# Patient Record
Sex: FEMALE | Race: WHITE | NOT HISPANIC OR LATINO | Employment: OTHER | URBAN - METROPOLITAN AREA
[De-identification: names, ages, dates, MRNs, and addresses within clinical notes are randomized per-mention and may not be internally consistent; named-entity substitution may affect disease eponyms.]

---

## 2017-07-15 ENCOUNTER — HOSPITAL ENCOUNTER (EMERGENCY)
Facility: MEDICAL CENTER | Age: 37
End: 2017-07-15
Attending: EMERGENCY MEDICINE
Payer: MEDICAID

## 2017-07-15 VITALS
RESPIRATION RATE: 20 BRPM | HEART RATE: 103 BPM | TEMPERATURE: 97.8 F | WEIGHT: 293 LBS | DIASTOLIC BLOOD PRESSURE: 64 MMHG | BODY MASS INDEX: 44.41 KG/M2 | HEIGHT: 68 IN | OXYGEN SATURATION: 95 % | SYSTOLIC BLOOD PRESSURE: 106 MMHG

## 2017-07-15 DIAGNOSIS — M25.541 ARTHRALGIA OF RIGHT HAND: Primary | ICD-10-CM

## 2017-07-15 PROCEDURE — 700111 HCHG RX REV CODE 636 W/ 250 OVERRIDE (IP): Performed by: EMERGENCY MEDICINE

## 2017-07-15 PROCEDURE — 96372 THER/PROPH/DIAG INJ SC/IM: CPT

## 2017-07-15 PROCEDURE — 99284 EMERGENCY DEPT VISIT MOD MDM: CPT

## 2017-07-15 RX ORDER — IBUPROFEN 600 MG/1
600 TABLET ORAL EVERY 6 HOURS PRN
Qty: 30 TAB | Refills: 0 | Status: SHIPPED | OUTPATIENT
Start: 2017-07-15 | End: 2019-12-12

## 2017-07-15 RX ORDER — DEXAMETHASONE SODIUM PHOSPHATE 10 MG/ML
10 INJECTION, SOLUTION INTRAMUSCULAR; INTRAVENOUS ONCE
Status: COMPLETED | OUTPATIENT
Start: 2017-07-15 | End: 2017-07-15

## 2017-07-15 RX ORDER — KETOROLAC TROMETHAMINE 30 MG/ML
30 INJECTION, SOLUTION INTRAMUSCULAR; INTRAVENOUS ONCE
Status: COMPLETED | OUTPATIENT
Start: 2017-07-15 | End: 2017-07-15

## 2017-07-15 RX ADMIN — KETOROLAC TROMETHAMINE 30 MG: 30 INJECTION, SOLUTION INTRAMUSCULAR at 01:15

## 2017-07-15 RX ADMIN — DEXAMETHASONE SODIUM PHOSPHATE 10 MG: 10 INJECTION, SOLUTION INTRAMUSCULAR; INTRAVENOUS at 01:13

## 2017-07-15 ASSESSMENT — LIFESTYLE VARIABLES: DO YOU DRINK ALCOHOL: NO

## 2017-07-15 ASSESSMENT — PAIN SCALES - GENERAL: PAINLEVEL_OUTOF10: 10

## 2017-07-15 NOTE — ED AVS SNAPSHOT
7/15/2017    Mila [place] [address]  [place] [address]    Dear Mila:    [facility] wants to ensure your discharge home is safe and you or your loved ones have had all of your questions answered regarding your care after you leave the hospital.    Below is a list of resources and contact information should you have any questions regarding your hospital stay, follow-up instructions, or active medical symptoms.    Questions or Concerns Regarding… Contact   Medical Questions Related to Your Discharge  (7 days a week, 8am-5pm) Contact a Nurse Care Coordinator   [phone]   Medical Questions Not Related to Your Discharge  (24 hours a day / 7 days a week)  Contact the Nurse Health Line   [phone]    Medications or Discharge Instructions Refer to your discharge packet   or contact your [facility] Primary Care Provider   [phone]   Follow-up Appointment(s) Schedule your appointment via Moblication   or contact Scheduling [phone]   Billing Review your statement via Moblication  or contact Billing [phone]   Medical Records Review your records via Moblication   or contact Medical Records [phone]     You may receive a telephone call within two days of discharge. This call is to make certain you understand your discharge instructions and have the opportunity to have any questions answered. You can also easily access your medical information, test results and upcoming appointments via the Moblication free online health management tool. You can learn more and sign up at [employer]Moblication. For assistance setting up your Moblication account, please call [phone].    Once again, we want to ensure your discharge home is safe and that you have a clear understanding of any next steps in your care. If you have any questions or concerns, please do not hesitate to contact us, we are here for you. Thank you for choosing [facility] for your healthcare needs.    Sincerely,    Your [facility] Healthcare Team

## 2017-07-15 NOTE — ED AVS SNAPSHOT
Home Care Instructions                                                                                                                Mila Galeana   MRN: [id number]    Department:  [facility], Emergency Dept   Date of Visit:  7/15/2017            [facility], Emergency Dept    [address] [facility] [address]    Phone:  [phone]      You were seen by     Evelyn Santos D.O.      Your Diagnosis Was     Arthralgia of right hand     M25.541       These are the medications you received during your hospitalization from 07/15/2017 0015 to 07/15/2017 0141     Date/Time Order Dose Route Action    07/15/2017 0115 ketorolac (TORADOL) injection 30 mg 30 mg Intramuscular Given    07/15/2017 0113 dexamethasone pf (DECADRON) injection 10 mg 10 mg Oral Given      Follow-up Information     1. Follow up with Dat Wilson M.D. In 1 week.    Specialty:  Orthopaedics    Why:  Orthopedics    Contact information    [address]  [address]  [facility] [address] [phone]          2. Follow up with [facility] In 3 days.    Contact information    [facility] [address] [phone]          3. Follow up with [facility] In 3 days.    Contact information    [address] [facility]  [phone]          4. Follow up with [facility] In 3 days.    Contact information    [address] [address] [phone]        5. Follow up with Moira Ling M.D. In 3 days.    Specialty:  Family Medicine    Contact information    [address] [place] [address] [address]  [address]  [facility] [address] [phone]        Medication Information     Review all of your home medications and newly ordered medications with your primary doctor and/or pharmacist as soon as possible. Follow medication instructions as directed by your doctor and/or pharmacist.     Please keep your complete medication list with you and share with your physician. Update the information when medications are discontinued,  doses are changed, or new medications (including over-the-counter products) are added; and carry medication information at all times in the event of emergency situations.               Medication List      START taking these medications        Instructions    Morning Afternoon Evening Bedtime    ibuprofen 600 MG Tabs   Commonly known as:  MOTRIN        Take 1 Tab by mouth every 6 hours as needed.   Dose:  600 mg                             Where to Get Your Medications      You can get these medications from any pharmacy     Bring a paper prescription for each of these medications    - ibuprofen 600 MG Tabs            Procedures and tests performed during your visit     SPLINT APPLICATION        Discharge Instructions       Follow-up with primary care or orthopedics next week for reevaluation, medication management and close blood pressure monitoring. Further outpatient testing or MRI may be indicated if symptoms persist.    Motrin 4 times daily as needed for hand pain or swelling.    Wrist splint for activity, typing and at night.    Return to the emergency department for persistent or worsening hand pain, swelling, paresthesias, weakness, fever or other new concerns.    Joint Pain  Joint pain can be caused by many things. The joint can be bruised, infected, weak from aging, or sore from exercise. The pain will probably go away if you follow your doctor's instructions for home care. If your joint pain continues, more tests may be needed to help find the cause of your condition.  HOME CARE  Watch your condition for any changes. Follow these instructions as told to lessen the pain that you are feeling:  · Take medicines only as told by your doctor.  · Rest the sore joint for as long as told by your doctor. If your doctor tells you to, raise (elevate) the painful joint above the level of your heart while you are sitting or lying down.  · Do not do things that cause pain or make the pain worse.  · If told, put ice on  the painful area:  ¨ Put ice in a plastic bag.  ¨ Place a towel between your skin and the bag.  ¨ Leave the ice on for 20 minutes, 2-3 times per day.  · Wear an elastic bandage, splint, or sling as told by your doctor. Loosen the bandage or splint if your fingers or toes lose feeling (become numb) and tingle, or if they turn cold and blue.  · Begin exercising or stretching the joint as told by your doctor. Ask your doctor what types of exercise are safe for you.  · Keep all follow-up visits as told by your doctor. This is important.  GET HELP IF:  · Your pain gets worse and medicine does not help it.  · Your joint pain does not get better in 3 days.  · You have more bruising or swelling.  · You have a fever.  · You lose 10 pounds (4.5 kg) or more without trying.  GET HELP RIGHT AWAY IF:  · You are not able to move the joint.  · Your fingers or toes become numb or they turn cold and blue.     This information is not intended to replace advice given to you by your health care provider. Make sure you discuss any questions you have with your health care provider.     Document Released: 12/06/2010 Document Revised: 01/08/2016 Document Reviewed: 09/29/2015  [employer] Interactive Patient Education ©2016 [employer]  Carpal Tunnel Syndrome  The carpal tunnel is an area under the skin of the palm of your hand. Nerves, blood vessels, and strong tissues (tendons) pass through the tunnel. The tunnel can become puffy (swollen). If this happens, a nerve can be pinched in the wrist. This causes carpal tunnel syndrome.   HOME CARE  · Take all medicine as told by your doctor.  · If you were given a splint, wear it as told. Wear it at night or at times when your doctor told you to.  · Rest your wrist from the activity that causes your pain.  · Put ice on your wrist after long periods of wrist activity.  ¨ Put ice in a plastic bag.  ¨ Place a towel between your skin and the bag.  ¨ Leave the ice on for 15-20 minutes, 03-04 times a  day.  · Keep all doctor visits as told.  GET HELP RIGHT AWAY IF:  · You have new problems you cannot explain.  · Your problems get worse and medicine does not help.  MAKE SURE YOU:   · Understand these instructions.  · Will watch your condition.  · Will get help right away if you are not doing well or get worse.     This information is not intended to replace advice given to you by your health care provider. Make sure you discuss any questions you have with your health care provider.     Document Released: 12/06/2012 Document Revised: 03/11/2013 Document Reviewed: 12/06/2012  [employer] Interactive Patient Education ©2016 [employer]            Patient Information     Patient Information    Following emergency treatment: all patient requiring follow-up care must return either to a private physician or a clinic if your condition worsens before you are able to obtain further medical attention, please return to the emergency room.     Billing Information    At [facility], we work to make the billing process streamlined for our patients.  Our Representatives are here to answer any questions you may have regarding your hospital bill.  If you have insurance coverage and have supplied your insurance information to us, we will submit a claim to your insurer on your behalf.  Should you have any questions regarding your bill, we can be reached online or by phone as follows:  Online: You are able pay your bills online or live chat with our representatives about any billing questions you may have. We are here to help Monday - Friday from 8:00am to 7:30pm and 9:00am - 12:00pm on Saturdays.  Please visit [email or web address]  for more information.   Phone:  [phone] or [phone]    Please note that your emergency physician, surgeon, pathologist, radiologist, anesthesiologist, and other specialists are not employed by [facility] and will therefore bill separately for their services.  Please  contact them directly for any questions concerning their bills at the numbers below:     Emergency Physician Services:  [phone]  [facility]:  [phone]  [facility]:  [phone]  [facility]:  [phone]    1. Your final bill may vary from the amount quoted upon discharge if all procedures are not complete at that time, or if your doctor has additional procedures of which we are not aware. You will receive an additional bill if you return to the Emergency Department at [facility] for suture removal regardless of the facility of which the sutures were placed.     2. Please arrange for settlement of this account at the emergency registration.    3. All self-pay accounts are due in full at the time of treatment.  If you are unable to meet this obligation then payment is expected within 4-5 days.     4. If you have had radiology studies (CT, X-ray, Ultrasound, MRI), you have received a preliminary result during your emergency department visit. Please contact the radiology department [phone] to receive a copy of your final result. Please discuss the Final result with your primary physician or with the follow up physician provided.     Crisis Hotline:  [place] Crisis Hotline:  5-464-CUQCWAA or 1-104.805.5599  [place] Crisis Hotline:    1-243.883.2478 or 725-322-3133         ED Discharge Follow Up Questions    1. In order to provide you with very good care, we would like to follow up with a phone call in the next few days.  May we have your permission to contact you?     YES /  NO    2. What is the best phone number to call you? (       )_____-__________    3. What is the best time to call you?      Morning  /  Afternoon  /  Evening                   Patient Signature:  ____________________________________________________________    Date:  ____________________________________________________________

## 2017-07-15 NOTE — ED PROVIDER NOTES
"ED Provider Note    CHIEF COMPLAINT  Chief Complaint   Patient presents with   • Hand Pain     right       HPI  Mila Galeana is a 37 y.o. female who presents to the emergency department complaining of intermittent right hand pain, numbness, tingling progressive over the last 1-2 weeks. Most discomfort and paresthesias over the lateral or subtle hand and 1st and 2nd digits. Patient has had some difficulty gripping objects. Intermittent use of aspirin or naproxen without relief at home. Patient denies any neck pain, stiffness or upper arm discomfort. Denies any trauma, strain, injury or new activities. However, patient does type on a computer regularly. No headache, visual changes, slurred speech or other focal weakness.    REVIEW OF SYSTEMS  See [facility] for further details. All other systems are negative.     PAST MEDICAL HISTORY    denies    SOCIAL HISTORY  Social History     Social History Main Topics   • Smoking status: Never Smoker    • Smokeless tobacco: Not on file   • Alcohol Use: No   • Drug Use: No   • Sexual Activity: Not on file       SURGICAL HISTORY  patient denies any surgical history    CURRENT MEDICATIONS  Home Medications     **Home medications have not yet been reviewed for this encounter**          ALLERGIES  Allergies   Allergen Reactions   • Sulfa Drugs        PHYSICAL EXAM  VITAL SIGNS: /108 mmHg  Pulse 126  Temp(Src) 36.6 °C (97.8 °F)  Resp 20  Ht 1.727 m (5' 8\")  Wt 258.5 kg (569 lb 14.2 oz)  BMI 86.67 kg/m2  SpO2 96%  Pulse ox interpretation: I interpret this pulse ox as normal.  Constitutional: Alert in no apparent distress. Morbidly obese.  HENT: Normocephalic, atraumatic. Bilateral external ears normal, Nose normal. Moist mucous membranes.    Eyes: Pupils are equal and reactive, Conjunctiva normal.   Neck: No midline tenderness to palpation, no step-offs. No paravertebral discomfort, no tenderness with deep palpation of the trapezius muscles. Normal range of motion without pain or " resistance. Supple.  Lymphatic: No lymphadenopathy noted.   Cardiovascular: Normal peripheral perfusion.  Thorax & Lungs: Nonlabored respirations.  Skin: Warm, Dry.  Musculoskeletal: Tenderness to palpation and percussion overlying the flexor retinaculum of the right wrist. Tenderness to palpation of the 1st and 2nd digits, upper sensitive to touch at the distal tips. Less than 2 2nd capillary refill. Sensation intact to light touch. Mild decrease in finger flexion of 1st and 2nd digits due to discomfort. No crepitus or deformity. No erythema or induration. No tenderness or resistance nor pain with range of motion at elbow, shoulder.  Neurologic: Alert , no gross focal deficit noted. Cranial nerves II through XII intact bilaterally. Speech clear. No facial droop. Ambulates independently. Nonfocal for extremity.  Psychiatric: Affect normal, Judgment normal, Mood normal.       COURSE & MEDICAL DECISION MAKING  Nursing notes and vital signs were reviewed. (See chart for details)  The patients records were reviewed, history was obtained from the patient;   ED evaluation for subacute progressive right hand pain and paresthesias is most consistent with a carpal tunnel syndrome. No history for trauma to suggest fracture. Note clinical or cutaneous evidence for infection, cellulitis, abscess or necrotizing fasciitis. CMS is intact distally. Cervical radiculopathy is considered and should be further evaluated with MRI if symptoms persist. Patient has been placed in a Velcro wrist splint. [facility] on [facility] emergency department. Continue Motrin at home until seen by primary care orthopedics.    Patient is stable for discharge at this time, anticipatory guidance provided, Motrin for discomfort, Velcro wrist splint, close follow-up is encouraged, and strict ED return instructions have been detailed. Patient is agreeable to the disposition and plan.    Patient's blood pressure was elevated in the emergency department, and has been  referred to primary care for close monitoring.    FINAL IMPRESSION  (M25.541) Arthralgia of right hand  (primary encounter diagnosis)      Electronically signed by: Evelyn Santos, 7/15/2017 1:05 AM      This dictation was created using voice recognition software. The accuracy of the dictation is limited to the abilities of the software. I expect there may be some errors of grammar and possibly content. The nursing notes were reviewed and certain aspects of this information were incorporated into this note.

## 2017-07-15 NOTE — DISCHARGE INSTRUCTIONS
Follow-up with primary care or orthopedics next week for reevaluation, medication management and close blood pressure monitoring. Further outpatient testing or MRI may be indicated if symptoms persist.    Motrin 4 times daily as needed for hand pain or swelling.    Wrist splint for activity, typing and at night.    Return to the emergency department for persistent or worsening hand pain, swelling, paresthesias, weakness, fever or other new concerns.    Joint Pain  Joint pain can be caused by many things. The joint can be bruised, infected, weak from aging, or sore from exercise. The pain will probably go away if you follow your doctor's instructions for home care. If your joint pain continues, more tests may be needed to help find the cause of your condition.  HOME CARE  Watch your condition for any changes. Follow these instructions as told to lessen the pain that you are feeling:  · Take medicines only as told by your doctor.  · Rest the sore joint for as long as told by your doctor. If your doctor tells you to, raise (elevate) the painful joint above the level of your heart while you are sitting or lying down.  · Do not do things that cause pain or make the pain worse.  · If told, put ice on the painful area:  ¨ Put ice in a plastic bag.  ¨ Place a towel between your skin and the bag.  ¨ Leave the ice on for 20 minutes, 2-3 times per day.  · Wear an elastic bandage, splint, or sling as told by your doctor. Loosen the bandage or splint if your fingers or toes lose feeling (become numb) and tingle, or if they turn cold and blue.  · Begin exercising or stretching the joint as told by your doctor. Ask your doctor what types of exercise are safe for you.  · Keep all follow-up visits as told by your doctor. This is important.  GET HELP IF:  · Your pain gets worse and medicine does not help it.  · Your joint pain does not get better in 3 days.  · You have more bruising or swelling.  · You have a fever.  · You lose 10  pounds (4.5 kg) or more without trying.  GET HELP RIGHT AWAY IF:  · You are not able to move the joint.  · Your fingers or toes become numb or they turn cold and blue.     This information is not intended to replace advice given to you by your health care provider. Make sure you discuss any questions you have with your health care provider.     Document Released: 12/06/2010 Document Revised: 01/08/2016 Document Reviewed: 09/29/2015  [employer] Patient Education ©2016 [employer]  Carpal Tunnel Syndrome  The carpal tunnel is an area under the skin of the palm of your hand. Nerves, blood vessels, and strong tissues (tendons) pass through the tunnel. The tunnel can become puffy (swollen). If this happens, a nerve can be pinched in the wrist. This causes carpal tunnel syndrome.   HOME CARE  · Take all medicine as told by your doctor.  · If you were given a splint, wear it as told. Wear it at night or at times when your doctor told you to.  · Rest your wrist from the activity that causes your pain.  · Put ice on your wrist after long periods of wrist activity.  ¨ Put ice in a plastic bag.  ¨ Place a towel between your skin and the bag.  ¨ Leave the ice on for 15-20 minutes, 03-04 times a day.  · Keep all doctor visits as told.  GET HELP RIGHT AWAY IF:  · You have new problems you cannot explain.  · Your problems get worse and medicine does not help.  MAKE SURE YOU:   · Understand these instructions.  · Will watch your condition.  · Will get help right away if you are not doing well or get worse.     This information is not intended to replace advice given to you by your health care provider. Make sure you discuss any questions you have with your health care provider.     Document Released: 12/06/2012 Document Revised: 03/11/2013 Document Reviewed: 12/06/2012  [employer] Patient Education ©2016 [employer]

## 2017-07-15 NOTE — ED NOTES
Pt ambulatory to triage c/o right hand pain/ intermittent numbness x 4 days. Pt denies injury. Small bruise noted to right thumb. Pt reports pain radiates up right arm. Educated on triage process, encouraged to inform staff of any changes.

## 2017-07-15 NOTE — ED AVS SNAPSHOT
[employer] Access Code: [email or web address]  Expires: 8/14/2017  1:40 AM    Your email address is not on file at [facility].  Email Addresses are required for you to sign up for [employer], please contact [phone] to verify your personal information and to provide your email address prior to attempting to register for [employer].    Mila Kervin [address] [address]  [place] [address]    [employer]  A secure, online tool to manage your health information     [facility]’s [employer]® is a secure, online tool that connects you to your personalized health information from the privacy of your home -- day or night - making it very easy for you to manage your healthcare. Once the activation process is completed, you can even access your medical information using the [employer] damian, which is available for free in the Apple Damian store or Google Play store.     To learn more about [employer], visit [email or web address]    There are two levels of access available (as shown below):   My Chart Features  [facility] Primary Care Doctor [facility]  Specialists [facility]  Urgent  Care [facility] Primary Care Doctor   Email your healthcare team securely and privately 24/7 X X X    Manage appointments: schedule your next appointment; view details of past/upcoming appointments X      Request prescription refills. X      View recent personal medical records, including lab and immunizations X X X X   View health record, including health history, allergies, medications X X X X   Read reports about your outpatient visits, procedures, consult and ER notes X X X X   See your discharge summary, which is a recap of your hospital and/or ER visit that includes your diagnosis, lab results, and care plan X X  X     How to register for [employer]:  Once your e-mail address has been verified, follow the following steps to sign up for [employer].     1. Go to  [email or web address]  2. Click on the Sign Up Now box, which takes you to the New Member Sign Up page. You will need to  provide the following information:  a. Enter your [employer] Access Code exactly as it appears at the top of this page. (You will not need to use this code after you’ve completed the sign-up process. If you do not sign up before the expiration date, you must request a new code.)   b. Enter your date of birth.   c. Enter your home email address.   d. Click Submit, and follow the next screen’s instructions.  3. Create a Plan Me Up ID. This will be your [employer] login ID and cannot be changed, so think of one that is secure and easy to remember.  4. Create a [employer] password. You can change your password at any time.  5. Enter your Password Reset Question and Answer. This can be used at a later time if you forget your password.   6. Enter your e-mail address. This allows you to receive e-mail notifications when new information is available in [employer].  7. Click Sign Up. You can now view your health information.    For assistance activating your [employer] account, call [phone]

## 2017-07-15 NOTE — ED NOTES
Discharge instructions and paper prescription given to patient; patient verbalized understanding. Patient's VS WNL for baseline. Patient ambulatory w/ steady slow gait upon leaving ED.

## 2019-12-12 ENCOUNTER — APPOINTMENT (OUTPATIENT)
Dept: CARDIOLOGY | Facility: MEDICAL CENTER | Age: 39
End: 2019-12-12
Attending: HOSPITALIST
Payer: MEDICAID

## 2019-12-12 ENCOUNTER — HOSPITAL ENCOUNTER (OUTPATIENT)
Facility: MEDICAL CENTER | Age: 39
End: 2019-12-16
Attending: EMERGENCY MEDICINE | Admitting: HOSPITALIST
Payer: MEDICAID

## 2019-12-12 DIAGNOSIS — M25.562 CHRONIC PAIN OF LEFT KNEE: ICD-10-CM

## 2019-12-12 DIAGNOSIS — K08.89 TOOTHACHE: ICD-10-CM

## 2019-12-12 DIAGNOSIS — R73.9 HYPERGLYCEMIA: ICD-10-CM

## 2019-12-12 DIAGNOSIS — E66.01 CLASS 3 SEVERE OBESITY DUE TO EXCESS CALORIES WITH SERIOUS COMORBIDITY AND BODY MASS INDEX (BMI) GREATER THAN OR EQUAL TO 70 IN ADULT (HCC): ICD-10-CM

## 2019-12-12 DIAGNOSIS — G89.29 CHRONIC PAIN OF LEFT KNEE: ICD-10-CM

## 2019-12-12 DIAGNOSIS — E03.9 HYPOTHYROIDISM, UNSPECIFIED TYPE: ICD-10-CM

## 2019-12-12 DIAGNOSIS — K04.7 DENTAL INFECTION: ICD-10-CM

## 2019-12-12 PROBLEM — L98.9 SCALP LESION: Status: ACTIVE | Noted: 2019-12-12

## 2019-12-12 PROBLEM — I10 ESSENTIAL HYPERTENSION: Status: ACTIVE | Noted: 2019-12-12

## 2019-12-12 PROBLEM — E66.9 OBESITY: Status: ACTIVE | Noted: 2019-12-12

## 2019-12-12 PROBLEM — E11.9 TYPE 2 DIABETES MELLITUS (HCC): Status: ACTIVE | Noted: 2019-12-12

## 2019-12-12 PROBLEM — R06.00 DYSPNEA: Status: ACTIVE | Noted: 2019-12-12

## 2019-12-12 LAB
ALBUMIN SERPL BCP-MCNC: 4 G/DL (ref 3.2–4.9)
ALBUMIN/GLOB SERPL: 1.3 G/DL
ALP SERPL-CCNC: 53 U/L (ref 30–99)
ALT SERPL-CCNC: 35 U/L (ref 2–50)
ANION GAP SERPL CALC-SCNC: 11 MMOL/L (ref 0–11.9)
AST SERPL-CCNC: 40 U/L (ref 12–45)
BASOPHILS # BLD AUTO: 0.9 % (ref 0–1.8)
BASOPHILS # BLD: 0.08 K/UL (ref 0–0.12)
BILIRUB SERPL-MCNC: 0.9 MG/DL (ref 0.1–1.5)
BUN SERPL-MCNC: 5 MG/DL (ref 8–22)
CALCIUM SERPL-MCNC: 9.1 MG/DL (ref 8.5–10.5)
CHLORIDE SERPL-SCNC: 101 MMOL/L (ref 96–112)
CO2 SERPL-SCNC: 23 MMOL/L (ref 20–33)
CREAT SERPL-MCNC: 1.01 MG/DL (ref 0.5–1.4)
EOSINOPHIL # BLD AUTO: 0.18 K/UL (ref 0–0.51)
EOSINOPHIL NFR BLD: 2.1 % (ref 0–6.9)
ERYTHROCYTE [DISTWIDTH] IN BLOOD BY AUTOMATED COUNT: 58.2 FL (ref 35.9–50)
GLOBULIN SER CALC-MCNC: 3.1 G/DL (ref 1.9–3.5)
GLUCOSE BLD-MCNC: 336 MG/DL (ref 65–99)
GLUCOSE BLD-MCNC: 338 MG/DL (ref 65–99)
GLUCOSE SERPL-MCNC: 409 MG/DL (ref 65–99)
HCT VFR BLD AUTO: 46.9 % (ref 37–47)
HGB BLD-MCNC: 15.1 G/DL (ref 12–16)
IMM GRANULOCYTES # BLD AUTO: 0.06 K/UL (ref 0–0.11)
IMM GRANULOCYTES NFR BLD AUTO: 0.7 % (ref 0–0.9)
LYMPHOCYTES # BLD AUTO: 1.52 K/UL (ref 1–4.8)
LYMPHOCYTES NFR BLD: 18 % (ref 22–41)
MCH RBC QN AUTO: 31 PG (ref 27–33)
MCHC RBC AUTO-ENTMCNC: 32.2 G/DL (ref 33.6–35)
MCV RBC AUTO: 96.3 FL (ref 81.4–97.8)
MONOCYTES # BLD AUTO: 0.52 K/UL (ref 0–0.85)
MONOCYTES NFR BLD AUTO: 6.2 % (ref 0–13.4)
NEUTROPHILS # BLD AUTO: 6.07 K/UL (ref 2–7.15)
NEUTROPHILS NFR BLD: 72.1 % (ref 44–72)
NRBC # BLD AUTO: 0 K/UL
NRBC BLD-RTO: 0 /100 WBC
PLATELET # BLD AUTO: 237 K/UL (ref 164–446)
PMV BLD AUTO: 9.7 FL (ref 9–12.9)
POTASSIUM SERPL-SCNC: 4 MMOL/L (ref 3.6–5.5)
PROT SERPL-MCNC: 7.1 G/DL (ref 6–8.2)
RBC # BLD AUTO: 4.87 M/UL (ref 4.2–5.4)
SODIUM SERPL-SCNC: 135 MMOL/L (ref 135–145)
T3FREE SERPL-MCNC: 2.81 PG/ML (ref 2.4–4.2)
T4 FREE SERPL-MCNC: 0.29 NG/DL (ref 0.53–1.43)
T4 FREE SERPL-MCNC: 0.34 NG/DL (ref 0.53–1.43)
TSH SERPL DL<=0.005 MIU/L-ACNC: 46.67 UIU/ML (ref 0.38–5.33)
WBC # BLD AUTO: 8.4 K/UL (ref 4.8–10.8)

## 2019-12-12 PROCEDURE — 96374 THER/PROPH/DIAG INJ IV PUSH: CPT

## 2019-12-12 PROCEDURE — 83036 HEMOGLOBIN GLYCOSYLATED A1C: CPT

## 2019-12-12 PROCEDURE — 99285 EMERGENCY DEPT VISIT HI MDM: CPT

## 2019-12-12 PROCEDURE — 700111 HCHG RX REV CODE 636 W/ 250 OVERRIDE (IP): Performed by: HOSPITALIST

## 2019-12-12 PROCEDURE — 700105 HCHG RX REV CODE 258: Performed by: EMERGENCY MEDICINE

## 2019-12-12 PROCEDURE — 85025 COMPLETE CBC W/AUTO DIFF WBC: CPT

## 2019-12-12 PROCEDURE — 80053 COMPREHEN METABOLIC PANEL: CPT

## 2019-12-12 PROCEDURE — 84443 ASSAY THYROID STIM HORMONE: CPT

## 2019-12-12 PROCEDURE — 99220 PR INITIAL OBSERVATION CARE,LEVL III: CPT | Performed by: HOSPITALIST

## 2019-12-12 PROCEDURE — 84481 FREE ASSAY (FT-3): CPT

## 2019-12-12 PROCEDURE — 700102 HCHG RX REV CODE 250 W/ 637 OVERRIDE(OP): Performed by: EMERGENCY MEDICINE

## 2019-12-12 PROCEDURE — G0378 HOSPITAL OBSERVATION PER HR: HCPCS

## 2019-12-12 PROCEDURE — A9270 NON-COVERED ITEM OR SERVICE: HCPCS | Performed by: HOSPITALIST

## 2019-12-12 PROCEDURE — 82962 GLUCOSE BLOOD TEST: CPT | Mod: 91

## 2019-12-12 PROCEDURE — 700102 HCHG RX REV CODE 250 W/ 637 OVERRIDE(OP): Performed by: HOSPITALIST

## 2019-12-12 PROCEDURE — 96372 THER/PROPH/DIAG INJ SC/IM: CPT

## 2019-12-12 PROCEDURE — 84439 ASSAY OF FREE THYROXINE: CPT

## 2019-12-12 PROCEDURE — A9270 NON-COVERED ITEM OR SERVICE: HCPCS | Performed by: EMERGENCY MEDICINE

## 2019-12-12 RX ORDER — PROMETHAZINE HYDROCHLORIDE 12.5 MG/1
12.5-25 SUPPOSITORY RECTAL EVERY 4 HOURS PRN
Status: DISCONTINUED | OUTPATIENT
Start: 2019-12-12 | End: 2019-12-16 | Stop reason: HOSPADM

## 2019-12-12 RX ORDER — ACETAMINOPHEN 500 MG
500-1000 TABLET ORAL EVERY 6 HOURS PRN
Status: ON HOLD | COMMUNITY
End: 2019-12-14

## 2019-12-12 RX ORDER — PROCHLORPERAZINE EDISYLATE 5 MG/ML
5-10 INJECTION INTRAMUSCULAR; INTRAVENOUS EVERY 4 HOURS PRN
Status: DISCONTINUED | OUTPATIENT
Start: 2019-12-12 | End: 2019-12-16 | Stop reason: HOSPADM

## 2019-12-12 RX ORDER — AMOXICILLIN 500 MG/1
500 CAPSULE ORAL EVERY 8 HOURS
Status: DISCONTINUED | OUTPATIENT
Start: 2019-12-12 | End: 2019-12-16 | Stop reason: HOSPADM

## 2019-12-12 RX ORDER — POLYETHYLENE GLYCOL 3350 17 G/17G
1 POWDER, FOR SOLUTION ORAL
Status: DISCONTINUED | OUTPATIENT
Start: 2019-12-12 | End: 2019-12-16 | Stop reason: HOSPADM

## 2019-12-12 RX ORDER — INSULIN GLARGINE 100 [IU]/ML
10 INJECTION, SOLUTION SUBCUTANEOUS EVERY EVENING
Status: DISCONTINUED | OUTPATIENT
Start: 2019-12-12 | End: 2019-12-13

## 2019-12-12 RX ORDER — ACETAMINOPHEN 325 MG/1
650 TABLET ORAL ONCE
Status: COMPLETED | OUTPATIENT
Start: 2019-12-12 | End: 2019-12-12

## 2019-12-12 RX ORDER — SODIUM CHLORIDE 9 MG/ML
INJECTION, SOLUTION INTRAVENOUS CONTINUOUS
Status: DISCONTINUED | OUTPATIENT
Start: 2019-12-12 | End: 2019-12-16 | Stop reason: HOSPADM

## 2019-12-12 RX ORDER — ONDANSETRON 2 MG/ML
4 INJECTION INTRAMUSCULAR; INTRAVENOUS EVERY 4 HOURS PRN
Status: DISCONTINUED | OUTPATIENT
Start: 2019-12-12 | End: 2019-12-16 | Stop reason: HOSPADM

## 2019-12-12 RX ORDER — LABETALOL HYDROCHLORIDE 5 MG/ML
10 INJECTION, SOLUTION INTRAVENOUS EVERY 4 HOURS PRN
Status: DISCONTINUED | OUTPATIENT
Start: 2019-12-12 | End: 2019-12-16 | Stop reason: HOSPADM

## 2019-12-12 RX ORDER — BISACODYL 10 MG
10 SUPPOSITORY, RECTAL RECTAL
Status: DISCONTINUED | OUTPATIENT
Start: 2019-12-12 | End: 2019-12-16 | Stop reason: HOSPADM

## 2019-12-12 RX ORDER — KETOROLAC TROMETHAMINE 30 MG/ML
30 INJECTION, SOLUTION INTRAMUSCULAR; INTRAVENOUS EVERY 6 HOURS PRN
Status: DISCONTINUED | OUTPATIENT
Start: 2019-12-12 | End: 2019-12-16 | Stop reason: HOSPADM

## 2019-12-12 RX ORDER — PROMETHAZINE HYDROCHLORIDE 25 MG/1
12.5-25 TABLET ORAL EVERY 4 HOURS PRN
Status: DISCONTINUED | OUTPATIENT
Start: 2019-12-12 | End: 2019-12-16 | Stop reason: HOSPADM

## 2019-12-12 RX ORDER — ONDANSETRON 4 MG/1
4 TABLET, ORALLY DISINTEGRATING ORAL EVERY 4 HOURS PRN
Status: DISCONTINUED | OUTPATIENT
Start: 2019-12-12 | End: 2019-12-16 | Stop reason: HOSPADM

## 2019-12-12 RX ORDER — AMOXICILLIN 500 MG/1
500 CAPSULE ORAL ONCE
Status: COMPLETED | OUTPATIENT
Start: 2019-12-12 | End: 2019-12-12

## 2019-12-12 RX ORDER — FAMOTIDINE 20 MG/1
20 TABLET, FILM COATED ORAL DAILY
COMMUNITY

## 2019-12-12 RX ORDER — ACETAMINOPHEN 325 MG/1
650 TABLET ORAL EVERY 6 HOURS PRN
Status: DISCONTINUED | OUTPATIENT
Start: 2019-12-12 | End: 2019-12-16 | Stop reason: HOSPADM

## 2019-12-12 RX ORDER — LISINOPRIL 20 MG/1
20 TABLET ORAL
Status: DISCONTINUED | OUTPATIENT
Start: 2019-12-12 | End: 2019-12-16 | Stop reason: HOSPADM

## 2019-12-12 RX ORDER — AMOXICILLIN 250 MG
2 CAPSULE ORAL 2 TIMES DAILY
Status: DISCONTINUED | OUTPATIENT
Start: 2019-12-12 | End: 2019-12-16 | Stop reason: HOSPADM

## 2019-12-12 RX ADMIN — INSULIN HUMAN 6 UNITS: 100 INJECTION, SOLUTION PARENTERAL at 16:51

## 2019-12-12 RX ADMIN — AMOXICILLIN 500 MG: 500 CAPSULE ORAL at 16:47

## 2019-12-12 RX ADMIN — AMOXICILLIN 500 MG: 500 CAPSULE ORAL at 12:22

## 2019-12-12 RX ADMIN — ACETAMINOPHEN 650 MG: 325 TABLET, FILM COATED ORAL at 17:58

## 2019-12-12 RX ADMIN — LEVOTHYROXINE SODIUM 125 MCG: 25 TABLET ORAL at 17:58

## 2019-12-12 RX ADMIN — SODIUM CHLORIDE: 9 INJECTION, SOLUTION INTRAVENOUS at 13:24

## 2019-12-12 RX ADMIN — ENOXAPARIN SODIUM 40 MG: 100 INJECTION SUBCUTANEOUS at 15:01

## 2019-12-12 RX ADMIN — INSULIN GLARGINE 10 UNITS: 100 INJECTION, SOLUTION SUBCUTANEOUS at 17:49

## 2019-12-12 RX ADMIN — LISINOPRIL 20 MG: 20 TABLET ORAL at 15:01

## 2019-12-12 RX ADMIN — ACETAMINOPHEN 650 MG: 325 TABLET, FILM COATED ORAL at 11:46

## 2019-12-12 RX ADMIN — KETOROLAC TROMETHAMINE 30 MG: 30 INJECTION, SOLUTION INTRAMUSCULAR at 16:45

## 2019-12-12 ASSESSMENT — LIFESTYLE VARIABLES
TOTAL SCORE: 0
TOTAL SCORE: 0
HAVE YOU EVER FELT YOU SHOULD CUT DOWN ON YOUR DRINKING: NO
TOTAL SCORE: 0
EVER FELT BAD OR GUILTY ABOUT YOUR DRINKING: NO
EVER HAD A DRINK FIRST THING IN THE MORNING TO STEADY YOUR NERVES TO GET RID OF A HANGOVER: NO
CONSUMPTION TOTAL: INCOMPLETE
DO YOU DRINK ALCOHOL: NO
HAVE PEOPLE ANNOYED YOU BY CRITICIZING YOUR DRINKING: NO
DOES PATIENT WANT TO STOP DRINKING: NO

## 2019-12-12 ASSESSMENT — ENCOUNTER SYMPTOMS
COUGH: 0
ORTHOPNEA: 0
NERVOUS/ANXIOUS: 1
NAUSEA: 0
PALPITATIONS: 0
POLYDIPSIA: 1
BLURRED VISION: 1
HEMOPTYSIS: 0
SPUTUM PRODUCTION: 0
CHILLS: 0
DIZZINESS: 0
VOMITING: 0
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
## 2019-12-12 NOTE — ED TRIAGE NOTES
Chief Complaint   Patient presents with   • Dental Pain   • Blurred Vision   Pt to triage in NAD.  Pt reports she has had dental pain that radiates to ears x 4 days.  Pt states dentist would not see her and wanted her to come to ED. Pt reports blurred vision x 1 week.  Pt educated on triage process and instructed to notify triage RN of any change in status.

## 2019-12-12 NOTE — ASSESSMENT & PLAN NOTE
TSH grossly elevated at 46  Hypothyroidism has been untreated for years, she was treated 10-15 years ago in [place] but has not followed up since and has not been on treatment  Start weight based synthroid  Check T4  Will need outpatient follow up

## 2019-12-12 NOTE — ASSESSMENT & PLAN NOTE
Previously untreated  Her diastolic blood pressure is 106, this places her at risk for endorgan damage  Start lisinopril, IV labetolol as needed for SBP greater than 165 or DBP greater than 100

## 2019-12-12 NOTE — ASSESSMENT & PLAN NOTE
Patient has a new diagnosis of diabetes  Uncontrolled with hyperglycemia, admission random blood sugar is above 400  I suspect she is going to need insulin  Check hemoglobin A1c  IV fluid hydration  Diabetic education ordered

## 2019-12-12 NOTE — ED NOTES
Patient no distress, report given to admission hold RN. All questions answered, ED RNs available for questions if needed

## 2019-12-13 LAB
ANION GAP SERPL CALC-SCNC: 6 MMOL/L (ref 0–11.9)
BASOPHILS # BLD AUTO: 1.1 % (ref 0–1.8)
BASOPHILS # BLD: 0.11 K/UL (ref 0–0.12)
BUN SERPL-MCNC: 7 MG/DL (ref 8–22)
CALCIUM SERPL-MCNC: 9.2 MG/DL (ref 8.5–10.5)
CHLORIDE SERPL-SCNC: 102 MMOL/L (ref 96–112)
CO2 SERPL-SCNC: 26 MMOL/L (ref 20–33)
CREAT SERPL-MCNC: 1.21 MG/DL (ref 0.5–1.4)
EOSINOPHIL # BLD AUTO: 0.17 K/UL (ref 0–0.51)
EOSINOPHIL NFR BLD: 1.7 % (ref 0–6.9)
ERYTHROCYTE [DISTWIDTH] IN BLOOD BY AUTOMATED COUNT: 61.1 FL (ref 35.9–50)
EST. AVERAGE GLUCOSE BLD GHB EST-MCNC: 303 MG/DL
GLUCOSE BLD-MCNC: 314 MG/DL (ref 65–99)
GLUCOSE BLD-MCNC: 330 MG/DL (ref 65–99)
GLUCOSE BLD-MCNC: 330 MG/DL (ref 65–99)
GLUCOSE BLD-MCNC: 347 MG/DL (ref 65–99)
GLUCOSE SERPL-MCNC: 329 MG/DL (ref 65–99)
HBA1C MFR BLD: 12.2 % (ref 0–5.6)
HCT VFR BLD AUTO: 44.9 % (ref 37–47)
HGB BLD-MCNC: 14 G/DL (ref 12–16)
IMM GRANULOCYTES # BLD AUTO: 0.05 K/UL (ref 0–0.11)
IMM GRANULOCYTES NFR BLD AUTO: 0.5 % (ref 0–0.9)
LYMPHOCYTES # BLD AUTO: 2.19 K/UL (ref 1–4.8)
LYMPHOCYTES NFR BLD: 22.4 % (ref 22–41)
MCH RBC QN AUTO: 30.7 PG (ref 27–33)
MCHC RBC AUTO-ENTMCNC: 31.2 G/DL (ref 33.6–35)
MCV RBC AUTO: 98.5 FL (ref 81.4–97.8)
MONOCYTES # BLD AUTO: 0.67 K/UL (ref 0–0.85)
MONOCYTES NFR BLD AUTO: 6.9 % (ref 0–13.4)
NEUTROPHILS # BLD AUTO: 6.58 K/UL (ref 2–7.15)
NEUTROPHILS NFR BLD: 67.4 % (ref 44–72)
NRBC # BLD AUTO: 0 K/UL
NRBC BLD-RTO: 0 /100 WBC
PLATELET # BLD AUTO: 267 K/UL (ref 164–446)
PMV BLD AUTO: 10.7 FL (ref 9–12.9)
POTASSIUM SERPL-SCNC: 4.3 MMOL/L (ref 3.6–5.5)
RBC # BLD AUTO: 4.56 M/UL (ref 4.2–5.4)
SODIUM SERPL-SCNC: 134 MMOL/L (ref 135–145)
WBC # BLD AUTO: 9.8 K/UL (ref 4.8–10.8)

## 2019-12-13 PROCEDURE — 700102 HCHG RX REV CODE 250 W/ 637 OVERRIDE(OP): Performed by: HOSPITALIST

## 2019-12-13 PROCEDURE — 80048 BASIC METABOLIC PNL TOTAL CA: CPT

## 2019-12-13 PROCEDURE — 36415 COLL VENOUS BLD VENIPUNCTURE: CPT

## 2019-12-13 PROCEDURE — 700111 HCHG RX REV CODE 636 W/ 250 OVERRIDE (IP): Performed by: HOSPITALIST

## 2019-12-13 PROCEDURE — 82962 GLUCOSE BLOOD TEST: CPT | Mod: 91

## 2019-12-13 PROCEDURE — 96376 TX/PRO/DX INJ SAME DRUG ADON: CPT

## 2019-12-13 PROCEDURE — G0378 HOSPITAL OBSERVATION PER HR: HCPCS

## 2019-12-13 PROCEDURE — 85025 COMPLETE CBC W/AUTO DIFF WBC: CPT

## 2019-12-13 PROCEDURE — 99225 PR SUBSEQUENT OBSERVATION CARE,LEVEL II: CPT | Performed by: HOSPITALIST

## 2019-12-13 PROCEDURE — A9270 NON-COVERED ITEM OR SERVICE: HCPCS | Performed by: HOSPITALIST

## 2019-12-13 PROCEDURE — 96372 THER/PROPH/DIAG INJ SC/IM: CPT

## 2019-12-13 RX ORDER — INSULIN GLARGINE 100 [IU]/ML
15 INJECTION, SOLUTION SUBCUTANEOUS EVERY EVENING
Status: DISCONTINUED | OUTPATIENT
Start: 2019-12-13 | End: 2019-12-15

## 2019-12-13 RX ORDER — LISINOPRIL 5 MG/1
5 TABLET ORAL
Status: DISCONTINUED | OUTPATIENT
Start: 2019-12-13 | End: 2019-12-13

## 2019-12-13 RX ADMIN — AMOXICILLIN 500 MG: 500 CAPSULE ORAL at 20:58

## 2019-12-13 RX ADMIN — INSULIN HUMAN 6 UNITS: 100 INJECTION, SOLUTION PARENTERAL at 17:46

## 2019-12-13 RX ADMIN — INSULIN GLARGINE 15 UNITS: 100 INJECTION, SOLUTION SUBCUTANEOUS at 17:46

## 2019-12-13 RX ADMIN — INSULIN HUMAN 6 UNITS: 100 INJECTION, SOLUTION PARENTERAL at 21:07

## 2019-12-13 RX ADMIN — KETOROLAC TROMETHAMINE 30 MG: 30 INJECTION, SOLUTION INTRAMUSCULAR at 19:34

## 2019-12-13 ASSESSMENT — COGNITIVE AND FUNCTIONAL STATUS - GENERAL
SUGGESTED CMS G CODE MODIFIER MOBILITY: CL
MOVING TO AND FROM BED TO CHAIR: A LOT
TOILETING: A LITTLE
STANDING UP FROM CHAIR USING ARMS: A LITTLE
TURNING FROM BACK TO SIDE WHILE IN FLAT BAD: A LITTLE
MOVING FROM LYING ON BACK TO SITTING ON SIDE OF FLAT BED: A LOT
SUGGESTED CMS G CODE MODIFIER DAILY ACTIVITY: CJ
DRESSING REGULAR UPPER BODY CLOTHING: A LITTLE
DRESSING REGULAR LOWER BODY CLOTHING: A LITTLE
WALKING IN HOSPITAL ROOM: A LOT
HELP NEEDED FOR BATHING: A LITTLE
DAILY ACTIVITIY SCORE: 20
MOBILITY SCORE: 14
CLIMB 3 TO 5 STEPS WITH RAILING: A LOT

## 2019-12-13 ASSESSMENT — LIFESTYLE VARIABLES
TOTAL SCORE: 0
DOES PATIENT WANT TO STOP DRINKING: NO
ON A TYPICAL DAY WHEN YOU DRINK ALCOHOL HOW MANY DRINKS DO YOU HAVE: 0
EVER FELT BAD OR GUILTY ABOUT YOUR DRINKING: NO
CONSUMPTION TOTAL: NEGATIVE
ALCOHOL_USE: NO
AVERAGE NUMBER OF DAYS PER WEEK YOU HAVE A DRINK CONTAINING ALCOHOL: 0
TOTAL SCORE: 0
TOTAL SCORE: 0
EVER HAD A DRINK FIRST THING IN THE MORNING TO STEADY YOUR NERVES TO GET RID OF A HANGOVER: NO
HOW MANY TIMES IN THE PAST YEAR HAVE YOU HAD 5 OR MORE DRINKS IN A DAY: 0
HAVE YOU EVER FELT YOU SHOULD CUT DOWN ON YOUR DRINKING: NO
EVER_SMOKED: NEVER
HAVE PEOPLE ANNOYED YOU BY CRITICIZING YOUR DRINKING: NO

## 2019-12-13 ASSESSMENT — ENCOUNTER SYMPTOMS
SPUTUM PRODUCTION: 0
BLURRED VISION: 0
PALPITATIONS: 0
NECK PAIN: 0
SHORTNESS OF BREATH: 0
SORE THROAT: 0
CLAUDICATION: 0
SENSORY CHANGE: 0
BACK PAIN: 0
MEMORY LOSS: 0
COUGH: 0
ORTHOPNEA: 0
DIZZINESS: 0
BLOOD IN STOOL: 0
PND: 0
SPEECH CHANGE: 0
MYALGIAS: 0
DEPRESSION: 0
NAUSEA: 0
TINGLING: 0
NERVOUS/ANXIOUS: 0
PHOTOPHOBIA: 0
CONSTIPATION: 0
FEVER: 0
EYE PAIN: 0
HEMOPTYSIS: 0
DOUBLE VISION: 0
WEAKNESS: 1
HEARTBURN: 0
VOMITING: 0
TREMORS: 0
HEADACHES: 0
STRIDOR: 0
CHILLS: 0

## 2019-12-13 ASSESSMENT — PATIENT HEALTH QUESTIONNAIRE - PHQ9
1. LITTLE INTEREST OR PLEASURE IN DOING THINGS: NOT AT ALL
SUM OF ALL RESPONSES TO PHQ9 QUESTIONS 1 AND 2: 0
2. FEELING DOWN, DEPRESSED, IRRITABLE, OR HOPELESS: NOT AT ALL

## 2019-12-13 NOTE — H&P
Hospital Medicine History & Physical Note    Date of Service  12/12/2019    Primary Care Physician  Pcp Pt States None    Consultants  None    Code Status  Full Code    Chief Complaint  Toothache    History of Presenting Illness  39 y.o. female who presented 12/12/2019 with a toothache.    Ms. Galeana has a history of obesity.  She has previously been diagnosed with hypothyroidism but has not been on treatment for some time.  The patient does not regularly get medical care and in fact has not left her house for approximately 2 years.  Over the past couple of weeks she has developed progressive pain left side of her mouth at the site of a cavity in her tooth.  The patient went to the dentist today.  At the dentist office she mentioned complaints of polydipsia and blurry vision.  She was sent to the emergency room for evaluation.  Antibiotics were recommended prior to any intervention on the tooth.  The patient was seen here in the emergency room and some basic labs have been sent.  Her blood glucose is above 400 and her TSH is 46.  Patient says that she has had polyuria and polydipsia for months, she endorses blurry vision and diaphoresis.  She has never been diagnosed with diabetes in the past and is not on any treatment.  We also discussed the results of her thyroid function tests.  She endorses fatigue and lack of energy, denies dry skin or dry hair.  In addition the patient complains of a nonhealing sore at her left frontal scalp.  She has severe pain in her left knee, this has been going on for some time as well.    Review of Systems  Review of Systems   Constitutional: Positive for malaise/fatigue. Negative for chills.   Eyes: Positive for blurred vision.   Respiratory: Negative for cough, hemoptysis and sputum production.    Cardiovascular: Negative for chest pain, palpitations and orthopnea.   Gastrointestinal: Negative for nausea and vomiting.   Skin: Negative for itching and rash.   Neurological: Negative for  dizziness.   Endo/Heme/Allergies: Positive for polydipsia.   Psychiatric/Behavioral: The patient is nervous/anxious.    All other systems reviewed and are negative.      Past Medical History  Obesity  Hypothyroidism    Surgical History   has no past surgical history on file.     Family History  Mother with diabetes    Social History   reports that she has never smoked. She has never used smokeless tobacco. She reports that she does not drink alcohol or use drugs.    Allergies  Allergies   Allergen Reactions   • Sulfa Drugs        Medications  Prior to Admission Medications   Prescriptions Last Dose Informant Patient Reported? Taking?   acetaminophen (TYLENOL) 500 MG Tab 12/11/2019 at PM Patient Yes Yes   Sig: Take 500-1,000 mg by mouth every 6 hours as needed.   asa/apap/caffeine (EXCEDRIN) 250-250-65 MG Tab FEW DAYS AGO at PRN Patient Yes Yes   Sig: Take 1-2 Tabs by mouth every 6 hours as needed for Headache.   famotidine (PEPCID) 20 MG Tab FEW DAYS AGO at UNK Patient Yes Yes   Sig: Take 20 mg by mouth every day.      Facility-Administered Medications: None       Physical Exam  Temp:  [36.6 °C (97.9 °F)] 36.6 °C (97.9 °F)  Pulse:  [] 90  Resp:  [18-20] 20  BP: (122-163)/() 145/93  SpO2:  [96 %-98 %] 96 %    Physical Exam  Constitutional:       General: She is not in acute distress.     Appearance: Normal appearance. She is obese.   HENT:      Head: Normocephalic and atraumatic.      Comments: Left frontal scale lesion, appears as excoriation with a scab, pigmentation normal    Multiple dental carries, left maxillary molar appears inflammed     Right Ear: External ear normal.      Left Ear: External ear normal.      Nose: Nose normal.      Mouth/Throat:      Mouth: Mucous membranes are moist.      Pharynx: Oropharynx is clear.   Eyes:      Extraocular Movements: Extraocular movements intact.      Conjunctiva/sclera: Conjunctivae normal.      Pupils: Pupils are equal, round, and reactive to light.   Neck:       Musculoskeletal: Normal range of motion and neck supple.   Cardiovascular:      Rate and Rhythm: Normal rate and regular rhythm.      Pulses: Normal pulses.      Comments: Distant heart sounds  Pulmonary:      Effort: Pulmonary effort is normal.      Breath sounds: Rales present.   Abdominal:      General: Abdomen is flat. Bowel sounds are normal.      Palpations: Abdomen is soft.      Hernia: No hernia is present.   Musculoskeletal: Normal range of motion.   Skin:     General: Skin is warm and dry.      Capillary Refill: Capillary refill takes less than 2 seconds.      Coloration: Skin is not jaundiced.   Neurological:      General: No focal deficit present.      Mental Status: She is alert and oriented to person, place, and time.      Cranial Nerves: No cranial nerve deficit.      Gait: Gait normal.   Psychiatric:         Mood and Affect: Mood normal.         Behavior: Behavior normal.         Laboratory:  Recent Labs     12/12/19  1056   WBC 8.4   RBC 4.87   HEMOGLOBIN 15.1   HEMATOCRIT 46.9   MCV 96.3   MCH 31.0   MCHC 32.2*   RDW 58.2*   PLATELETCT 237   MPV 9.7     Recent Labs     12/12/19  1056   SODIUM 135   POTASSIUM 4.0   CHLORIDE 101   CO2 23   GLUCOSE 409*   BUN 5*   CREATININE 1.01   CALCIUM 9.1     Recent Labs     12/12/19  1056   ALTSGPT 35   ASTSGOT 40   ALKPHOSPHAT 53   TBILIRUBIN 0.9   GLUCOSE 409*         No results for input(s): NTPROBNP in the last 72 hours.      No results for input(s): TROPONINT in the last 72 hours.    Urinalysis:    No results found     Imaging:  EC-ECHOCARDIOGRAM COMPLETE W/O CONT    (Results Pending)         Assessment/Plan:  I anticipate this patient is appropriate for observation status at this time.    * Type 2 diabetes mellitus (HCC)- (present on admission)  Assessment & Plan  Patient has a new diagnosis of diabetes  Uncontrolled with hyperglycemia, admission random blood sugar is above 400  I suspect she is going to need insulin  Check hemoglobin A1c  IV fluid  hydration  Diabetic education ordered    Hypothyroid- (present on admission)  Assessment & Plan  TSH grossly elevated at 46  Hypothyroidism has been untreated for years, she was treated 10-15 years ago in [place] but has not followed up since and has not been on treatment  Start weight based synthroid  Check T4  Will need outpatient follow up    Scalp lesion- (present on admission)  Assessment & Plan  Patient has a lesion of the left frontal scalp  I currently it appears just as though it has been scratched off and there is a scab there but she describes a nonhealing lesion which is been there for years  This should be biopsied as an outpatient, I discussed this with the patient and her sister, they are aware of the reason for a biopsy would be to rule out cancer    Dyspnea- (present on admission)  Assessment & Plan  This is likely related to obesity  Check echocardiogram    Dental infection- (present on admission)  Assessment & Plan  Start amoxicillin QID  Outpatient follow up at the dentist    Essential hypertension- (present on admission)  Assessment & Plan  Previously untreated  Her diastolic blood pressure is 106, this places her at risk for endorgan damage  Start lisinopril, IV labetolol as needed for SBP greater than 165 or DBP greater than 100      Chronic pain of left knee- (present on admission)  Assessment & Plan  Suspect this is also related to obesity  Check x-ray to rule out any obvious bony abnormality    Obesity- (present on admission)  Assessment & Plan  Body mass index is 91.45 kg/m².      VTE prophylaxis: lovenox

## 2019-12-13 NOTE — ASSESSMENT & PLAN NOTE
Patient has a lesion of the left frontal scalp  I currently it appears just as though it has been scratched off and there is a scab there but she describes a nonhealing lesion which is been there for years  This should be biopsied as an outpatient, I discussed this with the patient and her sister, they are aware of the reason for a biopsy would be to rule out cancer

## 2019-12-13 NOTE — PROGRESS NOTES
Pt to [place] to S[address] Bed 2. Pt leaves floor via he own WC pushed by transport staff, belongings collected, sister @ side. Report given to Liliane Neuro KIERAN.

## 2019-12-13 NOTE — PROGRESS NOTES
[facility] Medicine Daily Progress Note    Date of Service  12/13/2019    Chief Complaint  39 y.o. female admitted 12/12/2019 with toothache    Hospital Course    per HPI39 y.o. female who presented 12/12/2019 with a toothache.     Ms. Galeana has a history of obesity.  She has previously been diagnosed with hypothyroidism but has not been on treatment for some time.  The patient does not regularly get medical care and in fact has not left her house for approximately 2 years.  Over the past couple of weeks she has developed progressive pain left side of her mouth at the site of a cavity in her tooth.  The patient went to the dentist today.  At the dentist office she mentioned complaints of polydipsia and blurry vision.  She was sent to the emergency room for evaluation.  Antibiotics were recommended prior to any intervention on the tooth.  The patient was seen here in the emergency room and some basic labs have been sent.  Her blood glucose is above 400 and her TSH is 46.  Patient says that she has had polyuria and polydipsia for months, she endorses blurry vision and diaphoresis.  She has never been diagnosed with diabetes in the past and is not on any treatment.  We also discussed the results of her thyroid function tests.  She endorses fatigue and lack of energy, denies dry skin or dry hair.  In addition the patient complains of a nonhealing sore at her left frontal scalp.  She has severe pain in her left knee, this has been going on for some time as well.          Interval Problem Update  Patient still is complaining of left-sided facial discomfort, around her tooth area, although swelling has decreased, she still complains of 5 out of 10 pain at times.  Was complaining of left knee discomfort although chronic she has intermittent 4 out of 10, exacerbated by placing weight.    Consultants/Specialty  None    Code Status  Full Code    Disposition  TBD    Review of Systems  Review of Systems   Constitutional: Positive  for malaise/fatigue. Negative for chills and fever.   HENT: Negative for congestion, hearing loss, sore throat and tinnitus.         Tooth pain   Eyes: Negative for blurred vision, double vision, photophobia and pain.   Respiratory: Negative for cough, hemoptysis, sputum production, shortness of breath and stridor.    Cardiovascular: Negative for chest pain, palpitations, orthopnea, claudication and PND.   Gastrointestinal: Negative for blood in stool, constipation, heartburn, melena, nausea and vomiting.   Genitourinary: Negative for dysuria, frequency and urgency.   Musculoskeletal: Negative for back pain, myalgias and neck pain.   Neurological: Positive for weakness. Negative for dizziness, tingling, tremors, sensory change, speech change and headaches.   Psychiatric/Behavioral: Negative for depression, memory loss and suicidal ideas. The patient is not nervous/anxious.    All other systems reviewed and are negative.       Physical Exam  Temp:  [36.4 °C (97.6 °F)] 36.4 °C (97.6 °F)  Pulse:  [84] 84  Resp:  [18] 18  BP: (105)/(64) 105/64    Physical Exam  Vitals signs and nursing note reviewed.   Constitutional:       General: She is not in acute distress.     Appearance: Normal appearance. She is obese. She is not ill-appearing.   HENT:      Head: Normocephalic and atraumatic.      Nose: No congestion.      Mouth/Throat:      Mouth: Mucous membranes are moist.      Pharynx: No oropharyngeal exudate or posterior oropharyngeal erythema.      Comments: Mild swelling to left maxillary area no erythema otherwise no exudative discharge noted around her molar tooth, upper  Eyes:      Extraocular Movements: Extraocular movements intact.      Conjunctiva/sclera: Conjunctivae normal.      Pupils: Pupils are equal, round, and reactive to light.   Neck:      Musculoskeletal: Normal range of motion and neck supple. No neck rigidity.   Cardiovascular:      Pulses: Normal pulses.      Heart sounds: No murmur.   Pulmonary:       Effort: Pulmonary effort is normal. No respiratory distress.      Breath sounds: Normal breath sounds. No wheezing or rales.   Abdominal:      General: Abdomen is flat. Bowel sounds are normal. There is no distension.      Palpations: Abdomen is soft.      Tenderness: There is no tenderness. There is no guarding.   Musculoskeletal: Normal range of motion.         General: No swelling or tenderness.   Skin:     General: Skin is warm and dry.      Capillary Refill: Capillary refill takes less than 2 seconds.      Coloration: Skin is not jaundiced or pale.   Neurological:      General: No focal deficit present.      Mental Status: She is alert and oriented to person, place, and time. Mental status is at baseline.      Cranial Nerves: No cranial nerve deficit.   Psychiatric:         Mood and Affect: Mood normal.         Thought Content: Thought content normal.         Fluids  No intake or output data in the 24 hours ending 12/13/19 1502    Laboratory  Recent Labs     12/12/19  1056 12/13/19  0243   WBC 8.4 9.8   RBC 4.87 4.56   HEMOGLOBIN 15.1 14.0   HEMATOCRIT 46.9 44.9   MCV 96.3 98.5*   MCH 31.0 30.7   MCHC 32.2* 31.2*   RDW 58.2* 61.1*   PLATELETCT 237 267   MPV 9.7 10.7     Recent Labs     12/12/19  1056   SODIUM 135   POTASSIUM 4.0   CHLORIDE 101   CO2 23   GLUCOSE 409*   BUN 5*   CREATININE 1.01   CALCIUM 9.1                   Imaging  EC-ECHOCARDIOGRAM COMPLETE W/O CONT    (Results Pending)        Assessment/Plan  * Dental infection- (present on admission)  Assessment & Plan  Continue with amoxicillin   Panorex ordered and pending  Needs to follow up with outpatient dentistry   t    Hypothyroid- (present on admission)  Assessment & Plan  TSH is 46  Patient apparently knew that she had hypothyroidism but was untreated for many years  Free T4 is also low at 0.29  Patient was started on 125mcg of synthroid daily.  Needs outpatient endocrinology follow up.     Type 2 diabetes mellitus (HCC)- (present on  admission)  Assessment & Plan  Uncontrolled, A1c is 12.2  Continue Insulin-sliding scale, accu-checks and hypoglycemia protocol.  Continue with Consistent Carbohydrate diet   Medic education provided  Lantus 15U started.      Scalp lesion- (present on admission)  Assessment & Plan  Patient has a lesion of the left frontal scalp  There is a noted nonhealing scab, recommended outpatient follow-up for biopsy of skin  I did tell him that this can certainly be a skin cancer but she needs a dermatology.  Nonacute at this point    Dyspnea- (present on admission)  Assessment & Plan  Likely related to her obesity  Cardiogram pending  She has not hypoxic    Essential hypertension- (present on admission)  Assessment & Plan  Untreated, patient systolic blood pressure was over 170s on admission  Started low-dose lisinopril  IV PRN's as needed    Chronic pain of left knee- (present on admission)  Assessment & Plan  Suspect this is also related to obesity  Xray shows Mild degenerative change of the left knee articulation characterized by osteophytic spurring of the lateral compartment and some early subchondral sclerosis.  Recommend weight loss  PT and OT already ordered    Obesity- (present on admission)  Assessment & Plan  Body mass index is 91.45 kg/m².       Encourage healthy lifestyle and physical activity.      The total time spent face to face with this patient was about 33 mins of which 60% of time was spent on counseling, review of records including pertinent lab data and studies, as well as discussing diagnostic evaluation and work up, planned therapeutic interventions and future disposition of care. Where indicated, the assessment and plan reflect discussion of patient with consultants, other healthcare providers, family members, and additional research needed to obtain further information in formulating the plan of care of this patient.         VTE prophylaxis: Lovenox

## 2019-12-14 ENCOUNTER — PATIENT OUTREACH (OUTPATIENT)
Dept: HEALTH INFORMATION MANAGEMENT | Facility: OTHER | Age: 39
End: 2019-12-14

## 2019-12-14 ENCOUNTER — APPOINTMENT (OUTPATIENT)
Dept: CARDIOLOGY | Facility: MEDICAL CENTER | Age: 39
End: 2019-12-14
Attending: HOSPITALIST
Payer: MEDICAID

## 2019-12-14 LAB
GLUCOSE BLD-MCNC: 257 MG/DL (ref 65–99)
GLUCOSE BLD-MCNC: 263 MG/DL (ref 65–99)
GLUCOSE BLD-MCNC: 284 MG/DL (ref 65–99)
GLUCOSE BLD-MCNC: 304 MG/DL (ref 65–99)
LV EJECT FRACT  99904: 60

## 2019-12-14 PROCEDURE — 97535 SELF CARE MNGMENT TRAINING: CPT

## 2019-12-14 PROCEDURE — 96376 TX/PRO/DX INJ SAME DRUG ADON: CPT | Mod: XU

## 2019-12-14 PROCEDURE — 99225 PR SUBSEQUENT OBSERVATION CARE,LEVEL II: CPT | Performed by: HOSPITALIST

## 2019-12-14 PROCEDURE — 700102 HCHG RX REV CODE 250 W/ 637 OVERRIDE(OP): Performed by: HOSPITALIST

## 2019-12-14 PROCEDURE — G0378 HOSPITAL OBSERVATION PER HR: HCPCS

## 2019-12-14 PROCEDURE — 700111 HCHG RX REV CODE 636 W/ 250 OVERRIDE (IP): Performed by: HOSPITALIST

## 2019-12-14 PROCEDURE — 82962 GLUCOSE BLOOD TEST: CPT

## 2019-12-14 PROCEDURE — 96372 THER/PROPH/DIAG INJ SC/IM: CPT

## 2019-12-14 PROCEDURE — 93306 TTE W/DOPPLER COMPLETE: CPT | Mod: 26 | Performed by: INTERNAL MEDICINE

## 2019-12-14 PROCEDURE — 93306 TTE W/DOPPLER COMPLETE: CPT

## 2019-12-14 PROCEDURE — A9270 NON-COVERED ITEM OR SERVICE: HCPCS | Performed by: HOSPITALIST

## 2019-12-14 RX ORDER — LISINOPRIL 20 MG/1
20 TABLET ORAL DAILY
Qty: 30 TAB | Refills: 1 | Status: SHIPPED | OUTPATIENT
Start: 2019-12-15

## 2019-12-14 RX ORDER — AMOXICILLIN 500 MG/1
500 CAPSULE ORAL EVERY 8 HOURS
Qty: 30 CAP | Refills: 0 | Status: SHIPPED | OUTPATIENT
Start: 2019-12-14

## 2019-12-14 RX ORDER — LEVOTHYROXINE SODIUM 0.12 MG/1
125 TABLET ORAL
Qty: 30 TAB | Refills: 1 | Status: SHIPPED | OUTPATIENT
Start: 2019-12-15

## 2019-12-14 RX ADMIN — INSULIN HUMAN 5 UNITS: 100 INJECTION, SOLUTION PARENTERAL at 18:06

## 2019-12-14 RX ADMIN — LEVOTHYROXINE SODIUM 125 MCG: 25 TABLET ORAL at 05:30

## 2019-12-14 RX ADMIN — INSULIN HUMAN 5 UNITS: 100 INJECTION, SOLUTION PARENTERAL at 12:29

## 2019-12-14 RX ADMIN — KETOROLAC TROMETHAMINE 30 MG: 30 INJECTION, SOLUTION INTRAMUSCULAR at 10:34

## 2019-12-14 RX ADMIN — INSULIN GLARGINE 15 UNITS: 100 INJECTION, SOLUTION SUBCUTANEOUS at 18:06

## 2019-12-14 RX ADMIN — AMOXICILLIN 500 MG: 500 CAPSULE ORAL at 22:29

## 2019-12-14 RX ADMIN — INSULIN HUMAN 5 UNITS: 100 INJECTION, SOLUTION PARENTERAL at 08:31

## 2019-12-14 RX ADMIN — ACETAMINOPHEN 650 MG: 325 TABLET, FILM COATED ORAL at 18:12

## 2019-12-14 RX ADMIN — AMOXICILLIN 500 MG: 500 CAPSULE ORAL at 15:06

## 2019-12-14 RX ADMIN — ENOXAPARIN SODIUM 40 MG: 100 INJECTION SUBCUTANEOUS at 05:30

## 2019-12-14 RX ADMIN — ACETAMINOPHEN 650 MG: 325 TABLET, FILM COATED ORAL at 05:38

## 2019-12-14 RX ADMIN — KETOROLAC TROMETHAMINE 30 MG: 30 INJECTION, SOLUTION INTRAMUSCULAR at 01:35

## 2019-12-14 RX ADMIN — AMOXICILLIN 500 MG: 500 CAPSULE ORAL at 05:30

## 2019-12-14 RX ADMIN — INSULIN HUMAN 6 UNITS: 100 INJECTION, SOLUTION PARENTERAL at 22:39

## 2019-12-14 NOTE — FACE TO FACE
Face to Face Note  -  Durable Medical Equipment    Ever Ojeda M.D. - NPI: [id number]  I certify that this patient is under my care and that they had a durable medical equipment(DME)face to face encounter by myself that meets the physician DME face-to-face encounter requirements with this patient on:    Date of encounter:   Patient:                    MRN:                       YOB: 2017  Mila Galeana  [id number]  1980     The encounter with the patient was in whole, or in part, for the following medical condition, which is the primary reason for durable medical equipment:  Other - falls    I certify that, based on my findings, the following durable medical equipment is medically necessary:  Wheel Chair.    HOME O2 Saturation Measurements:(Values must be present for Home Oxygen orders)         ,     ,         My Clinical findings support the need for the above equipment due to:  Abnormal Gait    Supporting Symptoms: obesity morbid, cannot walk

## 2019-12-14 NOTE — DIETARY
"Nutrition services: Day 0 of admit.  Mila Galeana is a 39 y.o. female with admitting DX of Hyperglycemia  Consult received for Diabetic education (new diagnosis), MST 2 (weight loss, unsure)    Visit with pt and sister at bedside. Pt has been on low calorie diet (about 1000 kcal last few months). Discussed balanced eating habits for diabetic management and weight loss. Pt agreeable/motivated. Pt with dental infection causing pain, pt choosing foods carefully to not get anything stuck in it.    Assessment:  Height: 157.5 cm (5' 2.01\")  Weight: (!) 226.8 kg (500 lb)  Ideal Body Weight: 49.9 kg (110 lb)  Percent Ideal Body Weight: 454.5  Body mass index is 91.43 kg/m²., BMI classification: morbid obesity  Diet/Intake: Diabetic, % x1 meal.    Evaluation:   1. Weight per chart review: weight loss ~70 lb (14% over last 2 years), pt does not weigh herself at home    2. Labs: Glu 329 (A1c 12.2)   3. Meds: Lantus, SSI     Malnutrition Risk: Pt with intentional 14% weight loss over 2 years, pt at risk due to morbid obesity and reduced oral intake due to low calorie diet. Does not meet criteria at this time.     Recommendations/Plan:   1. Encourage intake of meals  2. Document intake of all meals as % taken in ADL's to provide interdisciplinary communication across all shifts.   3. Monitor weight.  4. Nutrition rep will continue to see patient for ongoing meal and snack preferences.     RD following weekly      "

## 2019-12-14 NOTE — PROGRESS NOTES
Pt is TRISHA/RODOLFO [place]. Pt c/o toothache 6/10 medicated per MAR. VSS on RA. Voiding. Able to make needs known. Family at bedside. Call light within reach. Hourly rounding in place.

## 2019-12-14 NOTE — CONSULTS
Diabetes Education:  Patient with new onset type 2 diabetes, HbA1c= 12.2%.  Discussed basic physiology of diabetes, nutrition, exercise. oral medications,  insulin, illness/injury, hormones, FSBG testing, preventing and treating hypoglycemia, preventing complications.  Supplied One Touch Verio glucometer and instructed in use.  Reviewed insulin types, technique for insulin administration, storage, disposal of needles.  Patient able to accurately  administer 10 units Normal Saline in pillow with demo insulin pen.   Taught use of insulin pens including using a new pen needle each time, wasting 2 units to prime needle, holding injection for a count of ten, removing needle from pen after each injection.  Insulin discharge instructions to be reviewed by RN upon discharge.  Written information provided.      I Recommend starting metformin 500 mg once daily, slowly increasing to 1000 mg twice daily to prevent diarrhea and mitigate weight gain from insulin.  (Patient prefers to wait until discharge to start the metformin so she can use her own bathroom.)    She will need to establish with a PCP to wean off of insulin once she is no longer glucose toxic, replacing with an SGLT-2 or GLP-1- information provided to patient.    Upon discharge, she will need prescriptions for One Touch Verio test strips, One Touch Delica lancets, metformin, Basaglar insulin pens, 6 mm x 32 gauge insulin pen needles.  If rapid acting insulin required, Admelog pens are preferred on her insurance.  Please call [phone] if needs change

## 2019-12-14 NOTE — CARE PLAN
Problem: Knowledge Deficit  Goal: Knowledge of disease process/condition, treatment plan, diagnostic tests, and medications will improve  Outcome: PROGRESSING AS EXPECTED   Reviewing plan of care, activities, and medication with patient.  Encouraging patient to ask questions and participate in plan of care.  Providing answers to all questions.  Continuing with current plan of care.  Hourly rounding in practice.       Problem: Pain Management  Goal: Pain level will decrease to patient's comfort goal  Outcome: PROGRESSING AS EXPECTED  Assessing pain level frequently using 0 to 10 scale.  Providing medication per MAR.  Providing non-pharmacological intervention and therapeutic communication.

## 2019-12-14 NOTE — DISCHARGE SUMMARY
Discharge Summary    CHIEF COMPLAINT ON ADMISSION  Chief Complaint   Patient presents with   • Dental Pain   • Blurred Vision       Reason for Admission  dental pain      Admission Date  12/12/2019    CODE STATUS  Full Code    HPI & HOSPITAL COURSE   is a very pleasant 39 y.o female admitted initially for a tooth ache.  However because patient has not been seen over several years, and she has had undiagnosed or uncontrolled diabetes and hypothyroidism inability to routinely ambulate patient was admitted for further medical management.  Fortunately patient was found to have a TSH over 46, blood sugars over 400 which possibly has been causing her polyuria as well as her fatigue.  As a result she was started on a sliding scale of insulin and long-acting, diabetic education was provided.  He was also started on Synthroid.  As well as antibiotics for tooth ache which over the past 2 days has been significantly helping her discomfort.  At this point I did offer patient possibility of going to a SNF again depending on physical therapy's recommendations however patient was adamant to go home.  Patient usually sits and sleeps all day, I told her that this is unhealthy for her.  Realized that she has significant obesity but I told her that she has to start a diet, and then possibly see a weight loss surgeon at some point.  Of course being on Synthroid and controlling her thyroid can certainly help her as well as with weight loss.  However patient has chosen to go home.  I have ordered home health on her behalf, a bariatric wheelchair as well as a front wheel walker.    At this point patient will be discharged on Synthroid, Lantus of 15 units acute nightly, diabetic education has been given, she did receive a glucometer, strips and further supplies.  I have also started her on low-dose metformin 250 mg twice daily.  As well as antibiotics amoxicillin for dental infection.  We also performed a Panorex which was also  negative for any abscesses.  Patient said that she does not have a primary care physician as a result scheduling has been called for her to be set up with a primary care physician.  Also recommended her to see a dentist when she is out and discharge.  Patient said she has help from her sister who will transport her.  I have ordered home health, bariatric wheelchair and a bariatric front wheel walker.        Therefore, she is discharged in good and stable condition to home with close outpatient follow-up.      Discharge Date  12/16/2019    FOLLOW UP ITEMS POST DISCHARGE  PCP in 1 week    DISCHARGE DIAGNOSES  Principal Problem:    Dental infection POA: Yes  Active Problems:    Type 2 diabetes mellitus (HCC) POA: Yes    Hypothyroid POA: Yes    Essential hypertension POA: Yes    Dyspnea POA: Yes    Scalp lesion POA: Yes    Obesity POA: Yes    Chronic pain of left knee POA: Yes  Resolved Problems:    * No resolved hospital problems. *      FOLLOW UP    No follow-up provider specified.    MEDICATIONS ON DISCHARGE     Medication List      START taking these medications      Instructions   amoxicillin 500 MG Caps  Commonly known as:  AMOXIL   Take 1 Cap by mouth every 8 hours.  Dose:  500 mg     insulin glargine 100 UNIT/ML Sopn injection  Commonly known as:  LANTUS SOLOSTAR   Inject 20 Units as instructed every evening for 30 days.  Dose:  20 Units     levothyroxine 125 MCG Tabs  Commonly known as:  SYNTHROID   Take 1 Tab by mouth Every morning on an empty stomach.  Dose:  125 mcg     lisinopril 20 MG Tabs  Commonly known as:  PRINIVIL   Take 1 Tab by mouth every day.  Dose:  20 mg         * metFORMIN 500 MG Tabs  Commonly known as:  GLUCOPHAGE   Take 0.5 Tabs by mouth 2 times a day, with meals.  Dose:  250 mg                 CONTINUE taking these medications      Instructions   famotidine 20 MG Tabs  Commonly known as:  PEPCID   Take 20 mg by mouth every day.  Dose:  20 mg        STOP taking these medications     acetaminophen 500 MG Tabs  Commonly known as:  TYLENOL     asa/apap/caffeine 250-250-65 MG Tabs  Commonly known as:  EXCEDRIN            Allergies  Allergies   Allergen Reactions   • Sulfa Drugs        DIET  Orders Placed This Encounter   Procedures   • Diet Order Diabetic     Standing Status:   Standing     Number of Occurrences:   1     Order Specific Question:   Diet:     Answer:   Diabetic [3]       ACTIVITY  As tolerated.  Weight bearing as tolerated    CONSULTATIONS  None    PROCEDURES  None    LABORATORY  Lab Results   Component Value Date    SODIUM 134 (L) 12/13/2019    POTASSIUM 4.3 12/13/2019    CHLORIDE 102 12/13/2019    CO2 26 12/13/2019    GLUCOSE 329 (H) 12/13/2019    BUN 7 (L) 12/13/2019    CREATININE 1.21 12/13/2019        Lab Results   Component Value Date    WBC 9.8 12/13/2019    HEMOGLOBIN 14.0 12/13/2019    HEMATOCRIT 44.9 12/13/2019    PLATELETCT 267 12/13/2019        Total time of the discharge process exceeds 33 minutes.

## 2019-12-14 NOTE — FACE TO FACE
Face to Face Supporting Documentation - Home Health    The encounter with this patient was in whole or in part the primary reason for home health admission.    Date of encounter:   Patient:                    MRN:                       YOB: 2017  Mila Galeana  [id number]  1980     Home health to see patient for:  Skilled Nursing care for assessment, interventions & education, Medical social work consult, Home health aide, Physical Therapy evaluation and treatment and Occupational therapy evaluation and treatment    Skilled need for:  Exacerbation of Chronic Disease State morbid obesity and Comment: morbid obesity    Skilled nursing interventions to include:  Comment: na    Homebound status evidenced by:  Need the aid of supportive devices such as crutches, canes, wheelchairs or walkers, Require the use of special transportation, Needs the assistance of another person in order to leave the home or Have a condition such that leaving his or her home is medically contraindicated. Leaving home requires a considerable and taxing effort. There is a normal inability to leave the home.    Community Physician to provide follow up care: Pcp Pt States None     Optional Interventions? No      I certify the face to face encounter for this home health care referral meets the CMS requirements and the encounter/clinical assessment with the patient was, in whole, or in part, for the medical condition(s) listed above, which is the primary reason for home health care. Based on my clinical findings: the service(s) are medically necessary, support the need for home health care, and the homebound criteria are met.  I certify that this patient has had a face to face encounter by myself.  Ever Ojeda M.D. - NPI: [id number]

## 2019-12-14 NOTE — THERAPY
PT eval attempted but pt stating she can't get out of bed because she is in too much pain from being in hospital bed. Bed adustment by OT to try to address discomfort but pt continued to c/o pain with lying on her back or side and received and left while sitting cross legged at end of bed. Pt given education about importance of mobility and pt stating she is at her baseline and will be able to move at home when in her own normal environment. Pt wanting a FWW to assist with mobility needs as she reports dififculty with attempting to use SPC. Recommend HH PT and bariatric FWW as pt appears to be at baseline mobility level.

## 2019-12-15 ENCOUNTER — APPOINTMENT (OUTPATIENT)
Dept: RADIOLOGY | Facility: MEDICAL CENTER | Age: 39
End: 2019-12-15
Attending: HOSPITALIST
Payer: MEDICAID

## 2019-12-15 ENCOUNTER — HOME HEALTH ADMISSION (OUTPATIENT)
Dept: HOME HEALTH SERVICES | Facility: HOME HEALTHCARE | Age: 39
End: 2019-12-15
Payer: MEDICAID

## 2019-12-15 LAB
GLUCOSE BLD-MCNC: 229 MG/DL (ref 65–99)
GLUCOSE BLD-MCNC: 235 MG/DL (ref 65–99)
GLUCOSE BLD-MCNC: 246 MG/DL (ref 65–99)
GLUCOSE BLD-MCNC: 267 MG/DL (ref 65–99)

## 2019-12-15 PROCEDURE — A9270 NON-COVERED ITEM OR SERVICE: HCPCS | Performed by: HOSPITALIST

## 2019-12-15 PROCEDURE — 82962 GLUCOSE BLOOD TEST: CPT

## 2019-12-15 PROCEDURE — 70355 PANORAMIC X-RAY OF JAWS: CPT

## 2019-12-15 PROCEDURE — 96372 THER/PROPH/DIAG INJ SC/IM: CPT

## 2019-12-15 PROCEDURE — 700102 HCHG RX REV CODE 250 W/ 637 OVERRIDE(OP): Performed by: HOSPITALIST

## 2019-12-15 PROCEDURE — 700111 HCHG RX REV CODE 636 W/ 250 OVERRIDE (IP): Performed by: HOSPITALIST

## 2019-12-15 PROCEDURE — 99225 PR SUBSEQUENT OBSERVATION CARE,LEVEL II: CPT | Performed by: HOSPITALIST

## 2019-12-15 PROCEDURE — G0378 HOSPITAL OBSERVATION PER HR: HCPCS

## 2019-12-15 RX ORDER — INSULIN GLARGINE 100 [IU]/ML
20 INJECTION, SOLUTION SUBCUTANEOUS EVERY EVENING
Status: DISCONTINUED | OUTPATIENT
Start: 2019-12-15 | End: 2019-12-16 | Stop reason: HOSPADM

## 2019-12-15 RX ORDER — PROCHLORPERAZINE MALEATE 5 MG/1
5 TABLET ORAL EVERY 6 HOURS PRN
Status: DISCONTINUED | OUTPATIENT
Start: 2019-12-15 | End: 2019-12-16 | Stop reason: HOSPADM

## 2019-12-15 RX ADMIN — INSULIN HUMAN 3 UNITS: 100 INJECTION, SOLUTION PARENTERAL at 18:33

## 2019-12-15 RX ADMIN — AMOXICILLIN 500 MG: 500 CAPSULE ORAL at 06:13

## 2019-12-15 RX ADMIN — METFORMIN HYDROCHLORIDE 500 MG: 500 TABLET ORAL at 09:58

## 2019-12-15 RX ADMIN — INSULIN HUMAN 3 UNITS: 100 INJECTION, SOLUTION PARENTERAL at 07:00

## 2019-12-15 RX ADMIN — AMOXICILLIN 500 MG: 500 CAPSULE ORAL at 13:24

## 2019-12-15 RX ADMIN — ENOXAPARIN SODIUM 40 MG: 100 INJECTION SUBCUTANEOUS at 06:10

## 2019-12-15 RX ADMIN — ACETAMINOPHEN 650 MG: 325 TABLET, FILM COATED ORAL at 18:32

## 2019-12-15 RX ADMIN — INSULIN HUMAN 3 UNITS: 100 INJECTION, SOLUTION PARENTERAL at 21:57

## 2019-12-15 RX ADMIN — INSULIN HUMAN 5 UNITS: 100 INJECTION, SOLUTION PARENTERAL at 13:22

## 2019-12-15 RX ADMIN — AMOXICILLIN 500 MG: 500 CAPSULE ORAL at 21:54

## 2019-12-15 RX ADMIN — METFORMIN HYDROCHLORIDE 500 MG: 500 TABLET ORAL at 18:32

## 2019-12-15 RX ADMIN — LEVOTHYROXINE SODIUM 125 MCG: 25 TABLET ORAL at 06:11

## 2019-12-15 RX ADMIN — PROCHLORPERAZINE MALEATE 5 MG: 5 TABLET ORAL at 09:58

## 2019-12-15 RX ADMIN — INSULIN GLARGINE 20 UNITS: 100 INJECTION, SOLUTION SUBCUTANEOUS at 18:33

## 2019-12-15 RX ADMIN — ONDANSETRON 4 MG: 4 TABLET, ORALLY DISINTEGRATING ORAL at 07:28

## 2019-12-15 ASSESSMENT — ENCOUNTER SYMPTOMS
MYALGIAS: 0
PND: 0
FEVER: 0
DIZZINESS: 0
COUGH: 0
SORE THROAT: 0
SENSORY CHANGE: 0
CONSTIPATION: 0
DEPRESSION: 0
BLOOD IN STOOL: 0
CLAUDICATION: 0
TINGLING: 0
PALPITATIONS: 0
PHOTOPHOBIA: 0
STRIDOR: 0
HEARTBURN: 0
WEAKNESS: 1
ORTHOPNEA: 0
EYE PAIN: 0
CHILLS: 0
HEMOPTYSIS: 0
HEADACHES: 0
DOUBLE VISION: 0
NAUSEA: 0
TREMORS: 0
SPUTUM PRODUCTION: 0
BACK PAIN: 0
VOMITING: 0
NECK PAIN: 0
SPEECH CHANGE: 0
NERVOUS/ANXIOUS: 0
MEMORY LOSS: 0
SHORTNESS OF BREATH: 0
BLURRED VISION: 0

## 2019-12-15 NOTE — DISCHARGE PLANNING
Upon verifying the address of the patient. It appears that they have a [place] address, which is outside of our service area. Please provide a [place] address.

## 2019-12-15 NOTE — DISCHARGE PLANNING
Anticipated Discharge Disposition: home with home health    Action: Choice obtained for home health: [facility] and DME: [facility] for FWW walker & preferred for bariatric wheelchair.     Barriers to Discharge: HH acceptance and delivery of bariatric wheelchair.     Plan: Follow up with [facility] and [facility] for wheelchair

## 2019-12-15 NOTE — THERAPY
OT omkar attempted, pt reported she has been up and OOB this AM. Pt made same comment to OT yesterday. Pt reports bed is uncomfortable and when given suggestions to get into bariatric w/c for relief, pt reports she is now feeling better and wants to stay into bed. Will re-attempt tomorrow if willing to participate.

## 2019-12-15 NOTE — DISCHARGE PLANNING
Received Choice form at 0845  Agency/Facility Name: [facility] (Walker only)  Referral sent per Choice form @ 0848     Received Choice form at 0850  Agency/Facility Name: [facility] (wheelchair)  Referral sent per Choice form @ 0905     Received Choice form at 0850  Agency/Facility Name: [facility]   Referral sent per Choice form @ 0905  CM checking on PCP for pt.

## 2019-12-15 NOTE — CARE PLAN
Problem: Safety  Goal: Will remain free from injury  Outcome: PROGRESSING AS EXPECTED  Goal: Will remain free from falls  Outcome: PROGRESSING AS EXPECTED     Problem: Knowledge Deficit  Goal: Knowledge of disease process/condition, treatment plan, diagnostic tests, and medications will improve  Outcome: PROGRESSING AS EXPECTED  Goal: Knowledge of the prescribed therapeutic regimen will improve  Outcome: PROGRESSING AS EXPECTED     Problem: Discharge Barriers/Planning  Goal: Patient's continuum of care needs will be met  Outcome: PROGRESSING AS EXPECTED

## 2019-12-15 NOTE — PROGRESS NOTES
[facility] Medicine Daily Progress Note    Date of Service  12/14/2019    Chief Complaint  39 y.o. female admitted 12/12/2019 with toothache    Hospital Course    per HPI39 y.o. female who presented 12/12/2019 with a toothache.     Ms. Galeana has a history of obesity.  She has previously been diagnosed with hypothyroidism but has not been on treatment for some time.  The patient does not regularly get medical care and in fact has not left her house for approximately 2 years.  Over the past couple of weeks she has developed progressive pain left side of her mouth at the site of a cavity in her tooth.  The patient went to the dentist today.  At the dentist office she mentioned complaints of polydipsia and blurry vision.  She was sent to the emergency room for evaluation.  Antibiotics were recommended prior to any intervention on the tooth.  The patient was seen here in the emergency room and some basic labs have been sent.  Her blood glucose is above 400 and her TSH is 46.  Patient says that she has had polyuria and polydipsia for months, she endorses blurry vision and diaphoresis.  She has never been diagnosed with diabetes in the past and is not on any treatment.  We also discussed the results of her thyroid function tests.  She endorses fatigue and lack of energy, denies dry skin or dry hair.  In addition the patient complains of a nonhealing sore at her left frontal scalp.  She has severe pain in her left knee, this has been going on for some time as well.          Interval Problem Update  Overall patient seen her tooth pain has improved, pain probably 3 out of 10 in severity exacerbated by chewing on that side.  In addition says she can sleep on his bed and she feels very tired.  Otherwise denies fever chills chest pain shortness of breath or nausea vomiting    Consultants/Specialty  None    Code Status  Full Code    Disposition  TBD    Review of Systems  Review of Systems   Constitutional: Positive for  malaise/fatigue. Negative for chills and fever.   HENT: Negative for congestion, hearing loss, sore throat and tinnitus.         Tooth pain   Eyes: Negative for blurred vision, double vision, photophobia and pain.   Respiratory: Negative for cough, hemoptysis, sputum production, shortness of breath and stridor.    Cardiovascular: Negative for chest pain, palpitations, orthopnea, claudication and PND.   Gastrointestinal: Negative for blood in stool, constipation, heartburn, melena, nausea and vomiting.   Genitourinary: Negative for dysuria, frequency and urgency.   Musculoskeletal: Negative for back pain, myalgias and neck pain.   Neurological: Positive for weakness (overall chronic). Negative for dizziness, tingling, tremors, sensory change, speech change and headaches.   Psychiatric/Behavioral: Negative for depression, memory loss and suicidal ideas. The patient is not nervous/anxious.    All other systems reviewed and are negative.       Physical Exam  Temp:  [36.3 °C (97.3 °F)-36.5 °C (97.7 °F)] 36.4 °C (97.5 °F)  Pulse:  [84-90] 89  Resp:  [16-18] 18  BP: (109-121)/(67-84) 119/78  SpO2:  [90 %-95 %] 90 %    Physical Exam  Vitals signs and nursing note reviewed.   Constitutional:       General: She is not in acute distress.     Appearance: Normal appearance. She is obese. She is not ill-appearing.      Comments: Morbidly obese   HENT:      Head: Normocephalic. Contusion present.      Jaw: Tenderness present.        Nose: No congestion.      Mouth/Throat:      Mouth: Mucous membranes are moist.      Pharynx: No oropharyngeal exudate or posterior oropharyngeal erythema.      Comments: Mild swelling to left maxillary area no erythema otherwise no exudative discharge noted around her molar tooth, upper  Eyes:      Extraocular Movements: Extraocular movements intact.      Conjunctiva/sclera: Conjunctivae normal.      Pupils: Pupils are equal, round, and reactive to light.   Neck:      Musculoskeletal: Normal range of  motion and neck supple. No neck rigidity.   Cardiovascular:      Pulses: Normal pulses.      Heart sounds: No murmur.   Pulmonary:      Effort: Pulmonary effort is normal. No respiratory distress.      Breath sounds: Normal breath sounds. No wheezing, rhonchi or rales.   Abdominal:      General: Abdomen is flat. Bowel sounds are normal. There is no distension.      Palpations: Abdomen is soft.      Tenderness: There is no tenderness. There is no guarding.   Musculoskeletal: Normal range of motion.         General: No swelling or tenderness.   Skin:     General: Skin is warm and dry.      Capillary Refill: Capillary refill takes less than 2 seconds.      Coloration: Skin is not jaundiced or pale.   Neurological:      General: No focal deficit present.      Mental Status: She is alert and oriented to person, place, and time. Mental status is at baseline.      Cranial Nerves: No cranial nerve deficit.   Psychiatric:         Mood and Affect: Mood normal.         Thought Content: Thought content normal.         Fluids    Intake/Output Summary (Last 24 hours) at 12/15/2019 0725  Last data filed at 12/14/2019 2200  Gross per 24 hour   Intake 500 ml   Output --   Net 500 ml       Laboratory  Recent Labs     12/12/19  1056 12/13/19  0243   WBC 8.4 9.8   RBC 4.87 4.56   HEMOGLOBIN 15.1 14.0   HEMATOCRIT 46.9 44.9   MCV 96.3 98.5*   MCH 31.0 30.7   MCHC 32.2* 31.2*   RDW 58.2* 61.1*   PLATELETCT 237 267   MPV 9.7 10.7     Recent Labs     12/12/19  1056 12/13/19  0243   SODIUM 135 134*   POTASSIUM 4.0 4.3   CHLORIDE 101 102   CO2 23 26   GLUCOSE 409* 329*   BUN 5* 7*   CREATININE 1.01 1.21   CALCIUM 9.1 9.2                   Imaging  EC-ECHOCARDIOGRAM COMPLETE W/O CONT   Final Result           Assessment/Plan  * Dental infection- (present on admission)  Assessment & Plan  Continue with amoxicillin PO  Panorex pending  Needs to follow up with outpatient dentistry       Hypothyroid- (present on admission)  Assessment & Plan  TSH  is 46  Patient apparently knew that she had hypothyroidism but was untreated for many years  Free T4 is also low at 0.29  Resume  125mcg of synthroid daily.  Needs outpatient endocrinology follow up.     Type 2 diabetes mellitus (HCC)- (present on admission)  Assessment & Plan  Uncontrolled, A1c is 12.2  Continue Insulin-sliding scale, accu-checks and hypoglycemia protocol.  Continue with Consistent Carbohydrate diet   Diabetic education provided, she has received all necessary glucometers and supplies  DM Lantus 15 units daily  Continue with metformin 500 mg twice daily       Scalp lesion- (present on admission)  Assessment & Plan  Patient has a lesion of the left frontal scalp  There is a noted nonhealing scab, recommended outpatient follow-up for biopsy of skin  I did tell him that this can certainly be a skin cancer but she needs a dermatology.  Nonacute at this point    Dyspnea- (present on admission)  Assessment & Plan  Likely secondary to obesity hypoventilation syndrome  She has not hypoxic    Essential hypertension- (present on admission)  Assessment & Plan  Was untreated on admission, currently controlled  Started low-dose lisinopril  IV PRN's as needed    Chronic pain of left knee- (present on admission)  Assessment & Plan  Suspect this is also related to obesity  Xray shows Mild degenerative change of the left knee articulation characterized by osteophytic spurring of the lateral compartment and some early subchondral sclerosis.  Recommend weight loss  Physical therapy did work with the patient    Obesity- (present on admission)  Assessment & Plan  Body mass index is 91.45 kg/m².       Encourage healthy lifestyle and physical activity.      Patient plan of care discussed at multidisplinary team rounds and with patient and R.N at beside.      VTE prophylaxis: Lovenox

## 2019-12-15 NOTE — DISCHARGE PLANNING
Thank you for sending this referral to [facility]. It appears that this patient does not have a PCP. Please provide information on the PCP or an appointment to establish with one. Referral pending.

## 2019-12-15 NOTE — CARE PLAN
Problem: Safety  Goal: Will remain free from injury  Outcome: PROGRESSING AS EXPECTED  Goal: Will remain free from falls  Outcome: PROGRESSING AS EXPECTED     Problem: Knowledge Deficit  Goal: Knowledge of disease process/condition, treatment plan, diagnostic tests, and medications will improve  Outcome: PROGRESSING AS EXPECTED  Goal: Knowledge of the prescribed therapeutic regimen will improve  Outcome: PROGRESSING AS EXPECTED     Problem: Discharge Barriers/Planning  Goal: Patient's continuum of care needs will be met  Outcome: PROGRESSING AS EXPECTED     Problem: Psychosocial Needs:  Goal: Level of anxiety will decrease  Outcome: PROGRESSING AS EXPECTED

## 2019-12-15 NOTE — DISCHARGE PLANNING
Pt confirmed that she does not have a PCP. Called [phone] and attempted to schedule a PCP apt. [facility] currently has no openings for medicaid patients. Cannot call [facility] or [facility] today due to office being close for weekend. Plan: call tomorrow to schedule PCP appointment. Updated bedside KIERAN Gómez.

## 2019-12-15 NOTE — THERAPY
Attempted OT eval today, pt reports that she has been up to the bathroom already and is requesting to defer. She reports pain from excessively inflated bed - inflation decreased on eligio bed and bed rails expanded for comfort. Discussed at home mobility and whether her current condition is impacting baseline function. She reports that she has had a fall at home and typically will furniture surf or use sister for assistance to get to bathroom, is essentially house bound and spends most time in bed. Patient requesting bariatric walker and would like to know more about shower chair. Will re-attempt for thorough evaluation as able.     Please contact with any questions:    Valorie Kincaid OTR/L     Pager: [phone]  Voicemail: [phone]

## 2019-12-16 VITALS
HEART RATE: 85 BPM | SYSTOLIC BLOOD PRESSURE: 104 MMHG | TEMPERATURE: 98.6 F | OXYGEN SATURATION: 92 % | BODY MASS INDEX: 53.92 KG/M2 | WEIGHT: 293 LBS | HEIGHT: 62 IN | RESPIRATION RATE: 15 BRPM | DIASTOLIC BLOOD PRESSURE: 60 MMHG

## 2019-12-16 LAB
GLUCOSE BLD-MCNC: 220 MG/DL (ref 65–99)
GLUCOSE BLD-MCNC: 230 MG/DL (ref 65–99)
GLUCOSE BLD-MCNC: 237 MG/DL (ref 65–99)

## 2019-12-16 PROCEDURE — 82962 GLUCOSE BLOOD TEST: CPT

## 2019-12-16 PROCEDURE — 99217 PR OBSERVATION CARE DISCHARGE: CPT | Performed by: HOSPITALIST

## 2019-12-16 PROCEDURE — 700102 HCHG RX REV CODE 250 W/ 637 OVERRIDE(OP): Performed by: HOSPITALIST

## 2019-12-16 PROCEDURE — 97165 OT EVAL LOW COMPLEX 30 MIN: CPT

## 2019-12-16 PROCEDURE — G0378 HOSPITAL OBSERVATION PER HR: HCPCS

## 2019-12-16 PROCEDURE — 700111 HCHG RX REV CODE 636 W/ 250 OVERRIDE (IP): Performed by: HOSPITALIST

## 2019-12-16 PROCEDURE — A9270 NON-COVERED ITEM OR SERVICE: HCPCS | Performed by: HOSPITALIST

## 2019-12-16 PROCEDURE — 96372 THER/PROPH/DIAG INJ SC/IM: CPT

## 2019-12-16 RX ADMIN — LEVOTHYROXINE SODIUM 125 MCG: 25 TABLET ORAL at 05:45

## 2019-12-16 RX ADMIN — METFORMIN HYDROCHLORIDE 250 MG: 500 TABLET ORAL at 17:40

## 2019-12-16 RX ADMIN — AMOXICILLIN 500 MG: 500 CAPSULE ORAL at 14:14

## 2019-12-16 RX ADMIN — INSULIN HUMAN 3 UNITS: 100 INJECTION, SOLUTION PARENTERAL at 11:58

## 2019-12-16 RX ADMIN — INSULIN GLARGINE 20 UNITS: 100 INJECTION, SOLUTION SUBCUTANEOUS at 17:30

## 2019-12-16 RX ADMIN — ENOXAPARIN SODIUM 40 MG: 100 INJECTION SUBCUTANEOUS at 05:45

## 2019-12-16 RX ADMIN — AMOXICILLIN 500 MG: 500 CAPSULE ORAL at 05:45

## 2019-12-16 RX ADMIN — LISINOPRIL 20 MG: 20 TABLET ORAL at 05:52

## 2019-12-16 RX ADMIN — ACETAMINOPHEN 650 MG: 325 TABLET, FILM COATED ORAL at 16:05

## 2019-12-16 RX ADMIN — ACETAMINOPHEN 650 MG: 325 TABLET, FILM COATED ORAL at 09:44

## 2019-12-16 RX ADMIN — INSULIN HUMAN 3 UNITS: 100 INJECTION, SOLUTION PARENTERAL at 08:09

## 2019-12-16 RX ADMIN — INSULIN HUMAN 3 UNITS: 100 INJECTION, SOLUTION PARENTERAL at 17:30

## 2019-12-16 RX ADMIN — METFORMIN HYDROCHLORIDE 500 MG: 500 TABLET ORAL at 09:40

## 2019-12-16 ASSESSMENT — ENCOUNTER SYMPTOMS
BLURRED VISION: 0
NECK PAIN: 0
SPEECH CHANGE: 0
BACK PAIN: 0
SORE THROAT: 0
SENSORY CHANGE: 0
FEVER: 0
HEMOPTYSIS: 0
SHORTNESS OF BREATH: 0
COUGH: 0
BLOOD IN STOOL: 0
DEPRESSION: 0
SPUTUM PRODUCTION: 0
STRIDOR: 0
PND: 0
NERVOUS/ANXIOUS: 0
NAUSEA: 0
MEMORY LOSS: 0
PALPITATIONS: 0
HEADACHES: 0
MYALGIAS: 0
DOUBLE VISION: 0
HEARTBURN: 0
VOMITING: 0
TINGLING: 0
PHOTOPHOBIA: 0
CLAUDICATION: 0
CHILLS: 0
EYE PAIN: 0
DIZZINESS: 0
WEAKNESS: 1
ORTHOPNEA: 0
CONSTIPATION: 0
TREMORS: 0

## 2019-12-16 ASSESSMENT — ACTIVITIES OF DAILY LIVING (ADL): TOILETING: REQUIRES ASSIST

## 2019-12-16 NOTE — DISCHARGE PLANNING
Agency/Facility Name: [facility]  Spoke To: Karl  Outcome: Bariatric wheelchair available. [facility] able to deliver to patient's home. Patient must contact [facility] to arrange delivery upon discharge. Phone number provider to Kinjal([email or web address]).

## 2019-12-16 NOTE — DISCHARGE PLANNING
Anticipated Discharge Disposition: Home with HH    Action: LSW met with pt to collect physical address as requested by [facility]. Pt provided the following address:  [address] [place] [address]    LSW [facility] ([phone]) and spoke to Angelika. Pt will have a f/u appointment with Oleg Pruitt 12/18 8:30 ([address]  After f/u appointment pt will need to setup another appointment to establish PCP.      Barriers to Discharge: HH acceptance    Plan: Await HH acceptance, f/u with [facility] for pt's wheelchair and FWW    Addendum 1250  Wheelchair and FWW discussed during rounds with Roberta ORTA. Roberta to f/u.   LSW updated Roberta with [facility] appointment and physical address.     Addendum 1816  [facility] needs to special request barriatric wheelchair for pt. [facility] to update Roberta ORTA.      Addendum 3936  LSW informed by Roberta ORTA that wheelchair available and [facility] will be dropping off at pt's bedside.   LSW updated bedside RNChandrika.

## 2019-12-16 NOTE — DISCHARGE PLANNING
Home Health to accept this referral pending patient's completion of appointment to establish with Oleg Puritt scheduled for 12/18/19 at 8:30. We will then see patient within 48 hours after  appointment.    Thank you!

## 2019-12-16 NOTE — PROGRESS NOTES
[facility] Medicine Daily Progress Note    Date of Service  12/15/2019    Chief Complaint  39 y.o. female admitted 12/12/2019 with toothache    Hospital Course    per HPI39 y.o. female who presented 12/12/2019 with a toothache.     Ms. Galeana has a history of obesity.  She has previously been diagnosed with hypothyroidism but has not been on treatment for some time.  The patient does not regularly get medical care and in fact has not left her house for approximately 2 years.  Over the past couple of weeks she has developed progressive pain left side of her mouth at the site of a cavity in her tooth.  The patient went to the dentist today.  At the dentist office she mentioned complaints of polydipsia and blurry vision.  She was sent to the emergency room for evaluation.  Antibiotics were recommended prior to any intervention on the tooth.  The patient was seen here in the emergency room and some basic labs have been sent.  Her blood glucose is above 400 and her TSH is 46.  Patient says that she has had polyuria and polydipsia for months, she endorses blurry vision and diaphoresis.  She has never been diagnosed with diabetes in the past and is not on any treatment.  We also discussed the results of her thyroid function tests.  She endorses fatigue and lack of energy, denies dry skin or dry hair.  In addition the patient complains of a nonhealing sore at her left frontal scalp.  She has severe pain in her left knee, this has been going on for some time as well.          Interval Problem Update  Overall patient seen her tooth pain has improved, pain probably 3 out of 10 in severity exacerbated by chewing on that side.  In addition says she can sleep on his bed and she feels very tired.  Otherwise denies fever chills chest pain shortness of breath or nausea vomiting    12/15  Patient overall feels tired, unchanged from yesterday.  Although her left-sided jaw discomfort has almost subsided, now 2-3/10, exacerbated by biting  on at one site.  But denies fevers chills    Consultants/Specialty  None    Code Status  Full Code    Disposition  TBD    Review of Systems  Review of Systems   Constitutional: Positive for malaise/fatigue (unchanged). Negative for chills and fever.   HENT: Negative for congestion, hearing loss, sore throat and tinnitus.         Tooth pain   Eyes: Negative for blurred vision, double vision, photophobia and pain.   Respiratory: Negative for cough, hemoptysis, sputum production, shortness of breath and stridor.    Cardiovascular: Negative for chest pain, palpitations, orthopnea, claudication and PND.   Gastrointestinal: Negative for blood in stool, constipation, heartburn, melena, nausea and vomiting.   Genitourinary: Negative for dysuria, frequency and urgency.   Musculoskeletal: Negative for back pain, myalgias and neck pain.   Neurological: Positive for weakness (overall chronic, unchanged). Negative for dizziness, tingling, tremors, sensory change, speech change and headaches.   Psychiatric/Behavioral: Negative for depression, memory loss and suicidal ideas. The patient is not nervous/anxious.    All other systems reviewed and are negative.       Physical Exam  Temp:  [36.1 °C (96.9 °F)-36.7 °C (98 °F)] 36.7 °C (98 °F)  Pulse:  [88-95] 88  Resp:  [16-18] 18  BP: (118-128)/(62-90) 128/82  SpO2:  [91 %-97 %] 94 %    Physical Exam  Vitals signs and nursing note reviewed.   Constitutional:       General: She is not in acute distress.     Appearance: Normal appearance. She is obese. She is not ill-appearing.      Comments: Morbidly obese   HENT:      Head: Normocephalic.      Jaw: Tenderness (minimal) present.      Nose: No congestion.      Mouth/Throat:      Mouth: Mucous membranes are moist.      Pharynx: No oropharyngeal exudate or posterior oropharyngeal erythema.      Comments: No noted external swelling of left maxillary area.  Eyes:      Extraocular Movements: Extraocular movements intact.       Conjunctiva/sclera: Conjunctivae normal.      Pupils: Pupils are equal, round, and reactive to light.   Neck:      Musculoskeletal: Normal range of motion and neck supple. No neck rigidity.   Cardiovascular:      Rate and Rhythm: Normal rate.      Pulses: Normal pulses.      Heart sounds: No murmur.   Pulmonary:      Effort: Pulmonary effort is normal. No respiratory distress.      Breath sounds: Normal breath sounds. No wheezing, rhonchi or rales.   Abdominal:      General: Abdomen is flat. Bowel sounds are normal. There is no distension.      Palpations: Abdomen is soft.      Tenderness: There is no tenderness. There is no guarding.   Musculoskeletal: Normal range of motion.         General: No swelling or tenderness.   Skin:     General: Skin is warm and dry.      Capillary Refill: Capillary refill takes less than 2 seconds.      Coloration: Skin is not jaundiced or pale.   Neurological:      General: No focal deficit present.      Mental Status: She is alert and oriented to person, place, and time. Mental status is at baseline.      Cranial Nerves: No cranial nerve deficit.   Psychiatric:         Mood and Affect: Mood normal.         Thought Content: Thought content normal.         Fluids    Intake/Output Summary (Last 24 hours) at 12/16/2019 0711  Last data filed at 12/15/2019 1800  Gross per 24 hour   Intake 240 ml   Output --   Net 240 ml       Laboratory                        Imaging  [id number]   Final Result      No acute fracture or bone erosion identified.      EC-ECHOCARDIOGRAM COMPLETE W/O CONT   Final Result           Assessment/Plan  * Dental infection- (present on admission)  Assessment & Plan  Continue with amoxicillin PO  Panorex shows No acute fracture or bone erosion identified  Needs to follow up with outpatient dentistry       Hypothyroid- (present on admission)  Assessment & Plan  TSH is 46  Patient apparently knew that she had hypothyroidism but was untreated for many years  Free  T4 is also low at 0.29  Resume 125mcg of synthroid daily.  Needs outpatient endocrinology follow up.     Type 2 diabetes mellitus (HCC)- (present on admission)  Assessment & Plan  Uncontrolled, A1c is 12.2  Continue Insulin-sliding scale, accu-checks and hypoglycemia protocol.  Continue with Consistent Carbohydrate diet   Diabetic education provided, she has received all necessary glucometers and supplies  Increased lantus to 20U qpm.   Continue with metformin 500 mg twice daily       Scalp lesion- (present on admission)  Assessment & Plan  Patient has a lesion of the left frontal scalp  There is a noted nonhealing scab, recommended outpatient follow-up for biopsy of skin  I did tell him that this can certainly be a skin cancer but she needs a dermatology.  Nonacute at this point    Dyspnea- (present on admission)  Assessment & Plan  Likely secondary to obesity hypoventilation syndrome  She has not hypoxic    Essential hypertension- (present on admission)  Assessment & Plan  Was untreated on admission, currently controlled  Started low-dose lisinopril  IV PRN's as needed    Chronic pain of left knee- (present on admission)  Assessment & Plan  Suspect this is also related to obesity  Xray shows Mild degenerative change of the left knee articulation characterized by osteophytic spurring of the lateral compartment and some early subchondral sclerosis.  Recommend weight loss  Physical therapy did work with the patient    Obesity- (present on admission)  Assessment & Plan  Body mass index is 91.45 kg/m².       Encourage healthy lifestyle and physical activity.      Patient plan of care discussed at multidisplinary team rounds and with patient and R.N at beside.      VTE prophylaxis: Lovenox

## 2019-12-16 NOTE — CARE PLAN
Problem: Safety  Goal: Will remain free from injury  Note:   Bed locked and in lowest position. Call light and personal belongings in reach. Bed alarm in place. Hourly rounding.       Problem: Pain Management  Goal: Pain level will decrease to patient's comfort goal  Note:   Pt medicated per MAR for jaw pain.

## 2019-12-16 NOTE — DISCHARGE INSTRUCTIONS
Discharge Instructions    Discharged to home by car with relative. Discharged via wheelchair, hospital escort: Refused.  Special equipment needed: Walker and Wheelchair    Be sure to schedule a follow-up appointment with your primary care doctor or any specialists as instructed.     Discharge Plan:   Diet Plan: Discussed  Activity Level: Discussed  Confirmed Follow up Appointment: Appointment Scheduled  Confirmed Symptoms Management: Discussed  Medication Reconciliation Updated: Yes  Influenza Vaccine Indication: Patient Refuses    I understand that a diet low in cholesterol, fat, and sodium is recommended for good health. Unless I have been given specific instructions below for another diet, I accept this instruction as my diet prescription.   Other diet: Diabetic    Special Instructions: None    · Is patient discharged on Warfarin / Coumadin?   No     Depression / Suicide Risk    As you are discharged from this [facility] facility, it is important to learn how to keep safe from harming yourself.    Recognize the warning signs:  · Abrupt changes in personality, positive or negative- including increase in energy   · Giving away possessions  · Change in eating patterns- significant weight changes-  positive or negative  · Change in sleeping patterns- unable to sleep or sleeping all the time   · Unwillingness or inability to communicate  · Depression  · Unusual sadness, discouragement and loneliness  · Talk of wanting to die  · Neglect of personal appearance   · Rebelliousness- reckless behavior  · Withdrawal from people/activities they love  · Confusion- inability to concentrate     If you or a loved one observes any of these behaviors or has concerns about self-harm, here's what you can do:  · Talk about it- your feelings and reasons for harming yourself  · Remove any means that you might use to hurt yourself (examples: pills, rope, extension cords, firearm)  · Get professional help from the community (Mental  Health, Substance Abuse, psychological counseling)  · Do not be alone:Call your Safe Contact- someone whom you trust who will be there for you.  · Call your local CRISIS HOTLINE 537-0291 or 697-320-7813  · Call your local Children's Mobile Crisis Response Team [place] (398) 434-6620 or www.[employer]  · Call the toll free National Suicide Prevention Hotlines   · National Suicide Prevention Lifeline 084-334-FKGH (3794)  · National Hope Line Network 800-SUICIDE (763-1869)        Type 2 Diabetes Mellitus, Diagnosis, Adult  Type 2 diabetes (type 2 diabetes mellitus) is a long-term (chronic) disease. In type 2 diabetes, one or both of these problems may be present:  · The pancreas does not make enough of a hormone called insulin.  · Cells in the body do not respond properly to insulin that the body makes (insulin resistance).  Normally, insulin allows blood sugar (glucose) to enter cells in the body. The cells use glucose for energy. Insulin resistance or lack of insulin causes excess glucose to build up in the blood instead of going into cells. As a result, high blood glucose (hyperglycemia) develops.  What increases the risk?  The following factors may make you more likely to develop type 2 diabetes:  · Having a family member with type 2 diabetes.  · Being overweight or obese.  · Having an inactive (sedentary) lifestyle.  · Having been diagnosed with insulin resistance.  · Having a history of prediabetes, gestational diabetes, or polycystic ovarian syndrome (PCOS).  · Being of American-, -American, /, or / descent.  What are the signs or symptoms?  In the early stage of this condition, you may not have symptoms. Symptoms develop slowly and may include:  · Increased thirst (polydipsia).  · Increased hunger (polyphagia).  · Increased urination (polyuria).  · Increased urination during the night (nocturia).  · Unexplained weight loss.  · Frequent infections that  keep coming back (recurring).  · Fatigue.  · Weakness.  · Vision changes, such as blurry vision.  · Cuts or bruises that are slow to heal.  · Tingling or numbness in the hands or feet.  · Dark patches on the skin (acanthosis nigricans).  How is this diagnosed?     This condition is diagnosed based on your symptoms, your medical history, a physical exam, and your blood glucose level. Your blood glucose may be checked with one or more of the following blood tests:  · A fasting blood glucose (FBG) test. You will not be allowed to eat (you will fast) for at least 8 hours before a blood sample is taken.  · A random blood glucose test. This checks blood glucose at any time of day regardless of when you ate.  · An A1c (hemoglobin A1c) blood test. This provides information about blood glucose control over the previous 2-3 months.  · An oral glucose tolerance test (OGTT). This measures your blood glucose at two times:  ¨ After fasting. This is your baseline blood glucose level.  ¨ Two hours after drinking a beverage that contains glucose.  You may be diagnosed with type 2 diabetes if:  · Your FBG level is 126 mg/dL (7.0 mmol/L) or higher.  · Your random blood glucose level is 200 mg/dL (11.1 mmol/L) or higher.  · Your A1c level is 6.5% or higher.  · Your OGGT result is higher than 200 mg/dL (11.1 mmol/L).  These blood tests may be repeated to confirm your diagnosis.  How is this treated?  Your treatment may be managed by a specialist called an endocrinologist. Type 2 diabetes may be treated by following instructions from your health care provider about:  · Making diet and lifestyle changes. This may include:  ¨ Following an individualized nutrition plan that is developed by a diet and nutrition specialist (registered dietitian).  ¨ Exercising regularly.  ¨ Finding ways to manage stress.  · Checking your blood glucose level as often as directed.  · Taking diabetes medicines or insulin daily. This helps to keep your blood  glucose levels in the healthy range.  ¨ If you use insulin, you may need to adjust the dosage depending on how physically active you are and what foods you eat. Your health care provider will tell you how to adjust your dosage.  · Taking medicines to help prevent complications from diabetes, such as:  ¨ Aspirin.  ¨ Medicine to lower cholesterol.  ¨ Medicine to control blood pressure.  Your health care provider will set individualized treatment goals for you. Your goals will be based on your age, other medical conditions you have, and how you respond to diabetes treatment. Generally, the goal of treatment is to maintain the following blood glucose levels:  · Before meals (preprandial):  mg/dL (4.4-7.2 mmol/L).  · After meals (postprandial): below 180 mg/dL (10 mmol/L).  · A1c level: less than 7%.  Follow these instructions at home:  Questions to Ask Your Health Care Provider   Consider asking the following questions:  · Do I need to meet with a diabetes educator?  · Where can I find a support group for people with diabetes?  · What equipment will I need to manage my diabetes at home?  · What diabetes medicines do I need, and when should I take them?  · How often do I need to check my blood glucose?  · What number can I call if I have questions?  · When is my next appointment?  General instructions  · Take over-the-counter and prescription medicines only as told by your health care provider.  · Keep all follow-up visits as told by your health care provider. This is important.  · For more information about diabetes, visit:  ¨ American Diabetes Association (ADA): www.diabetes.org  ¨ American Association of Diabetes Educators (AADE): www.diabeteseducator.org/patient-resources  Contact a health care provider if:  · Your blood glucose is at or above 240 mg/dL (13.3 mmol/L) for 2 days in a row.  · You have been sick or have had a fever for 2 days or longer and you are not getting better.  · You have any of the  following problems for more than 6 hours:  ¨ You cannot eat or drink.  ¨ You have nausea and vomiting.  ¨ You have diarrhea.  Get help right away if:  · Your blood glucose is lower than 54 mg/dL (3.0 mmol/L).  · You become confused or you have trouble thinking clearly.  · You have difficulty breathing.  · You have moderate or large ketone levels in your urine.  This information is not intended to replace advice given to you by your health care provider. Make sure you discuss any questions you have with your health care provider.  Document Released: 12/18/2006 Document Revised: 05/25/2017 Document Reviewed: 01/20/2017  [employer] Interactive Patient Education © 2017 [employer]      Diabetes Mellitus and Food  It is important for you to manage your blood sugar (glucose) level. Your blood glucose level can be greatly affected by what you eat. Eating healthier foods in the appropriate amounts throughout the day at about the same time each day will help you control your blood glucose level. It can also help slow or prevent worsening of your diabetes mellitus. Healthy eating may even help you improve the level of your blood pressure and reach or maintain a healthy weight.  General recommendations for healthful eating and cooking habits include:  · Eating meals and snacks regularly. Avoid going long periods of time without eating to lose weight.  · Eating a diet that consists mainly of plant-based foods, such as fruits, vegetables, nuts, legumes, and whole grains.  · Using low-heat cooking methods, such as baking, instead of high-heat cooking methods, such as deep frying.  Work with your dietitian to make sure you understand how to use the Nutrition Facts information on food labels.  How can food affect me?  Carbohydrates   Carbohydrates affect your blood glucose level more than any other type of food. Your dietitian will help you determine how many carbohydrates to eat at each meal and teach you how to count  carbohydrates. Counting carbohydrates is important to keep your blood glucose at a healthy level, especially if you are using insulin or taking certain medicines for diabetes mellitus.  Alcohol   Alcohol can cause sudden decreases in blood glucose (hypoglycemia), especially if you use insulin or take certain medicines for diabetes mellitus. Hypoglycemia can be a life-threatening condition. Symptoms of hypoglycemia (sleepiness, dizziness, and disorientation) are similar to symptoms of having too much alcohol.  If your health care provider has given you approval to drink alcohol, do so in moderation and use the following guidelines:  · Women should not have more than one drink per day, and men should not have more than two drinks per day. One drink is equal to:  ¨ 12 oz of beer.  ¨ 5 oz of wine.  ¨ 1½ oz of hard liquor.  · Do not drink on an empty stomach.  · Keep yourself hydrated. Have water, diet soda, or unsweetened iced tea.  · Regular soda, juice, and other mixers might contain a lot of carbohydrates and should be counted.  What foods are not recommended?  As you make food choices, it is important to remember that all foods are not the same. Some foods have fewer nutrients per serving than other foods, even though they might have the same number of calories or carbohydrates. It is difficult to get your body what it needs when you eat foods with fewer nutrients. Examples of foods that you should avoid that are high in calories and carbohydrates but low in nutrients include:  · Trans fats (most processed foods list trans fats on the Nutrition Facts label).  · Regular soda.  · Juice.  · Candy.  · Sweets, such as cake, pie, doughnuts, and cookies.  · Fried foods.  What foods can I eat?  Eat nutrient-rich foods, which will nourish your body and keep you healthy. The food you should eat also will depend on several factors, including:  · The calories you need.  · The medicines you take.  · Your weight.  · Your blood  glucose level.  · Your blood pressure level.  · Your cholesterol level.  You should eat a variety of foods, including:  · Protein.  ¨ Lean cuts of meat.  ¨ Proteins low in saturated fats, such as fish, egg whites, and beans. Avoid processed meats.  · Fruits and vegetables.  ¨ Fruits and vegetables that may help control blood glucose levels, such as apples, mangoes, and yams.  · Dairy products.  ¨ Choose fat-free or low-fat dairy products, such as milk, yogurt, and cheese.  · Grains, bread, pasta, and rice.  ¨ Choose whole grain products, such as multigrain bread, whole oats, and brown rice. These foods may help control blood pressure.  · Fats.  ¨ Foods containing healthful fats, such as nuts, avocado, olive oil, canola oil, and fish.  Does everyone with diabetes mellitus have the same meal plan?  Because every person with diabetes mellitus is different, there is not one meal plan that works for everyone. It is very important that you meet with a dietitian who will help you create a meal plan that is just right for you.  This information is not intended to replace advice given to you by your health care provider. Make sure you discuss any questions you have with your health care provider.  Document Released: 09/14/2006 Document Revised: 05/25/2017 Document Reviewed: 11/14/2014  [employer] Interactive Patient Education © 2017 [employer]

## 2019-12-16 NOTE — DISCHARGE PLANNING
Agency/Facility Name: [facility]  Spoke To: Rico  Outcome: [facility]ified [facility]  of patient's appointment on 12/18 at [facility] and provided home address.  Patient accepted pending completion of PCP appointment.    Agency/Facility Name: [facility]  Spoke To: Rep   Outcome: Per staff, wheelchair must be special ordered as patient requires bariatric wheelchair. Requested ETA of delivery for special ordered equipment. Rep to follow up on status of referral and call back.    K[place](VLADIMIR) updated.

## 2019-12-16 NOTE — DISCHARGE PLANNING
Agency/Facility Name: [facility]  Spoke To: Karl  Outcome: Patient now requesting delivery to bedside. [facility] notified and will deliver wheelchair to bedside. [facility]) notified.

## 2019-12-17 NOTE — DISCHARGE PLANNING
LSW placed call to [facility], LSW reached after hours call center and spoke with [place] who advised referral has nt bee processed at this time and [facility] does not do Wheelchair delivery after-hours.      LSW left report for Unit RNCM to follow up once BIPIN Amato office opens in the morning.

## 2019-12-17 NOTE — DISCHARGE PLANNING
Agency/Facility Name: [facility]  Spoke To: Bailey  Outcome: Per Bailey, [facility] does not have bariatric wheelchair available.     Agency/Facility Name: [facility]  Spoke To: Magdalena  Outcome: Bariatric wheelchair may be available. Referral sent to [facility].    Agency/Facility Name: [facility]  Outcome: Not contracted with patient's insurance.    Agency/Facility Name: [facility]  Spoke To: Val  Outcome: Unable to provide bariatric wheelchair at this time.

## 2019-12-17 NOTE — PROGRESS NOTES
Pt discharged via wheelchair with sister. Discharge education provided to patient and family. Pt verbalized understanding of education given. All belongings accounted for.

## 2019-12-18 NOTE — DISCHARGE PLANNING
ATTN: Case Management  RE: Referral for Home Health    Reason for referral denial: Patient rescheduled appt from 12/18/2018 @ 830 to 1/6/2020. No PCP to follow              Unfortunately, we are not able to accept this referral for the reason listed above. If further clarity is needed, our Transitional Care Specialists are available to discuss any barriers to service at [phone].      We look forward to collaborating with you in the future,  [facility] Team